# Patient Record
Sex: MALE | Race: WHITE | NOT HISPANIC OR LATINO | Employment: OTHER | URBAN - METROPOLITAN AREA
[De-identification: names, ages, dates, MRNs, and addresses within clinical notes are randomized per-mention and may not be internally consistent; named-entity substitution may affect disease eponyms.]

---

## 2020-03-06 ENCOUNTER — TRANSCRIBE ORDERS (OUTPATIENT)
Dept: PHYSICAL THERAPY | Facility: MEDICAL CENTER | Age: 70
End: 2020-03-06

## 2020-03-06 ENCOUNTER — EVALUATION (OUTPATIENT)
Dept: PHYSICAL THERAPY | Facility: MEDICAL CENTER | Age: 70
End: 2020-03-06
Payer: MEDICARE

## 2020-03-06 DIAGNOSIS — S32.401D CLOSED NONDISPLACED FRACTURE OF RIGHT ACETABULUM WITH ROUTINE HEALING, UNSPECIFIED PORTION OF ACETABULUM, SUBSEQUENT ENCOUNTER: Primary | ICD-10-CM

## 2020-03-06 PROCEDURE — 97110 THERAPEUTIC EXERCISES: CPT | Performed by: PHYSICAL THERAPIST

## 2020-03-06 PROCEDURE — 97161 PT EVAL LOW COMPLEX 20 MIN: CPT | Performed by: PHYSICAL THERAPIST

## 2020-03-06 NOTE — PROGRESS NOTES
PT Evaluation     Today's date: 3/6/2020  Patient name: Lulu Coffey  : 1950  MRN: [id number]  Referring provider: Cathi Aparicio MD  Dx:   Encounter Diagnosis     ICD-10-CM    1  Closed nondisplaced fracture of right acetabulum with routine healing, unspecified portion of acetabulum, subsequent encounter S32 401D                   Assessment  Assessment details: Pt is a pleasant 70 yo male presenting to physical therapy with a R acetabular fracture  Pt would benefit from skilled PT to address current impairments and maximize function  Understanding of Dx/Px/POC: good   Prognosis: good    Goals  Impairment Goals  - Pt I with initial HEP in 1-2 visits  - Improve ROM equal to contralateral side in 4-6 weeks  - Increase strength to 5/5 in all affected areas in 4-6 week    Functional Goals  - Increase FOTO to at least 51 in 6-8 weeks  - Patient will be independent with comprehensive HEP in 6-8 weeks  - Maximize function and endurance in 6-8 weeks    Plan  Patient would benefit from: skilled physical therapy  Other planned modality interventions: Modalities prn  Planned therapy interventions: manual therapy, neuromuscular re-education, patient education, balance, strengthening, stretching, therapeutic activities, therapeutic exercise, gait training and home exercise program  Frequency: 2x week  Duration in weeks: 8  Treatment plan discussed with: family and patient        Subjective Evaluation    History of Present Illness  Mechanism of injury: Pt had a seizure while sleeping and ended up with a acetabulum fracture  This was 2019  He spent 2 weeks in the hospital  The fracture left him with a significant LLD  He has had two episodes of home care PT and one episode of outpatient PT  Pt recently had a 2 week hospital stay secondary to getting overdosed on a medication  He is very deconditioned and sleeps/sits for a lot of the day  Pt notes that he does not sleep well at night    His R hip is very weak and he cannot lift it very well  He requires the assistance of a leg raiser to lift his leg  Pt is planning on having an external lift put on his R shoe  Pts extensive PMH was reviewed via Care Everywhere  Pain  Current pain rating: 3  At best pain ratin  At worst pain ratin    Treatments  Previous treatment: physical therapy  Patient Goals  Patient goals for therapy: increased strength and increased motion  Patient goal: increase endurance, increased mobility, return to driving        Objective     Observations     Additional Observation Details  Gait: Pt ambulates I and safely with a rolling walker  He has a short stride length B  Pt is wearing a removable external heel lift on his R shoe  Endurance: significantly decreased    Balance: significantly decreased B     Neurological Testing     Sensation     Hip   Left Hip   Intact: light touch    Right Hip   Intact: light touch    Active Range of Motion     Additional Active Range of Motion Details  Pt struggles with all AROM R LE  Passive Range of Motion     Additional Passive Range of Motion Details  PROM is WFL in all planes except R hip ext is -10, IR is -10 and flexion is 80 degrees    Joint Play   Left Hip   Joints within functional limits are the posterior hip capsule, anterior hip capsule and lateral hip capsule       Right Hip     Hypomobile in the posterior hip capsule, anterior hip capsule and lateral hip capsule    Strength/Myotome Testing     Left Hip   Planes of Motion   Flexion: 3  Extension: 3  Abduction: 2+  Adduction: 3    Isolated Muscles   Gluteus checo: 3    Right Hip   Planes of Motion   Flexion: 2+  Extension: 2+  Abduction: 2  Adduction: 2+    Isolated Muscles   Gluteus maximums: 2+      Flowsheet Rows      Most Recent Value   PT/OT G-Codes   Current Score  37   Projected Score  51             Precautions: See Care Everywhere for PMH, extensive PMH      Manual  3/6            PROM PRN Exercise Diary  3/6            LTR x20            Supine hip IR/ER with knee ext x20            Add sq 5"  x20            T-band abd Pink  x20            Glute sets 5"  x20            Heel slides NV            Bolster bridge NV                                                                                                                                                                                         Modalities  3/6             PRN

## 2020-03-06 NOTE — LETTER
2020    Argelia Burnett MD  [address]  [facility] [address] [address]    Patient: Mauro Merlin  YOB: 1950   Date of Visit: 3/6/2020     Encounter Diagnosis     ICD-10-CM    1  Closed nondisplaced fracture of right acetabulum with routine healing, unspecified portion of acetabulum, subsequent encounter S32 401D        Dear Dr Johnson Common:    Thank you for your recent referral of Mauro Merlin    Please review the attached evaluation summary from Marshall's recent visit  Please verify that you agree with the plan of care by signing the attached order  If you have any questions or concerns, please do not hesitate to call  I sincerely appreciate the opportunity to share in the care of one of your patients and hope to have another opportunity to work with you in the near future  Sincerely,    Cleveland Lilly, PT      Referring Provider:      I certify that I have read the below Plan of Care and certify the need for these services furnished under this plan of treatment while under my care  Argelia Burnett MD  [address]  [facility] [address] [address]  VIA Facsimile: [phone]          PT Evaluation     Today's date: 3/6/2020  Patient name: Mauro Merlin  : 1950  MRN: [id number]  Referring provider: Juan Augustine MD  Dx:   Encounter Diagnosis     ICD-10-CM    1  Closed nondisplaced fracture of right acetabulum with routine healing, unspecified portion of acetabulum, subsequent encounter S32 401D                   Assessment  Assessment details: Pt is a pleasant 70 yo male presenting to physical therapy with a R acetabular fracture  Pt would benefit from skilled PT to address current impairments and maximize function    Understanding of Dx/Px/POC: good   Prognosis: good    Goals  Impairment Goals  - Pt I with initial HEP in 1-2 visits  - Improve ROM equal to contralateral side in 4-6 weeks  - Increase strength to 5/5 in all affected areas in 4-6 week    Functional Goals  - Increase FOTO to at least 51 in 6-8 weeks  - Patient will be independent with comprehensive HEP in 6-8 weeks  - Maximize function and endurance in 6-8 weeks    Plan  Patient would benefit from: skilled physical therapy  Other planned modality interventions: Modalities prn  Planned therapy interventions: manual therapy, neuromuscular re-education, patient education, balance, strengthening, stretching, therapeutic activities, therapeutic exercise, gait training and home exercise program  Frequency: 2x week  Duration in weeks: 8  Treatment plan discussed with: family and patient        Subjective Evaluation    History of Present Illness  Mechanism of injury: Pt had a seizure while sleeping and ended up with a acetabulum fracture  This was 2019  He spent 2 weeks in the hospital  The fracture left him with a significant LLD  He has had two episodes of home care PT and one episode of outpatient PT  Pt recently had a 2 week hospital stay secondary to getting overdosed on a medication  He is very deconditioned and sleeps/sits for a lot of the day  Pt notes that he does not sleep well at night  His R hip is very weak and he cannot lift it very well  He requires the assistance of a leg raiser to lift his leg  Pt is planning on having an external lift put on his R shoe  Pts extensive PMH was reviewed via Care Everywhere  Pain  Current pain rating: 3  At best pain ratin  At worst pain ratin    Treatments  Previous treatment: physical therapy  Patient Goals  Patient goals for therapy: increased strength and increased motion  Patient goal: increase endurance, increased mobility, return to driving        Objective     Observations     Additional Observation Details  Gait: Pt ambulates I and safely with a rolling walker  He has a short stride length B  Pt is wearing a removable external heel lift on his R shoe      Endurance: significantly decreased    Balance: significantly decreased B     Neurological Testing     Sensation     Hip   Left Hip   Intact: light touch    Right Hip   Intact: light touch    Active Range of Motion     Additional Active Range of Motion Details  Pt struggles with all AROM R LE  Passive Range of Motion     Additional Passive Range of Motion Details  PROM is WFL in all planes except R hip ext is -10, IR is -10 and flexion is 80 degrees    Joint Play   Left Hip   Joints within functional limits are the posterior hip capsule, anterior hip capsule and lateral hip capsule       Right Hip     Hypomobile in the posterior hip capsule, anterior hip capsule and lateral hip capsule    Strength/Myotome Testing     Left Hip   Planes of Motion   Flexion: 3  Extension: 3  Abduction: 2+  Adduction: 3    Isolated Muscles   Gluteus checo: 3    Right Hip   Planes of Motion   Flexion: 2+  Extension: 2+  Abduction: 2  Adduction: 2+    Isolated Muscles   Gluteus maximums: 2+      Flowsheet Rows      Most Recent Value   PT/OT G-Codes   Current Score  37   Projected Score  51             Precautions: See Care Everywhere for PMH, extensive PMH      Manual  3/6            PROM PRN                                                                    Exercise Diary  3/6            LTR x20            Supine hip IR/ER with knee ext x20            Add sq 5"  x20            T-band abd Pink  x20            Glute sets 5"  x20            Heel slides NV            Bolster bridge NV                                                                                                                                                                                         Modalities  3/6            MH PRN

## 2020-03-09 ENCOUNTER — OFFICE VISIT (OUTPATIENT)
Dept: PHYSICAL THERAPY | Facility: MEDICAL CENTER | Age: 70
End: 2020-03-09
Payer: MEDICARE

## 2020-03-09 DIAGNOSIS — S32.401D CLOSED NONDISPLACED FRACTURE OF RIGHT ACETABULUM WITH ROUTINE HEALING, UNSPECIFIED PORTION OF ACETABULUM, SUBSEQUENT ENCOUNTER: Primary | ICD-10-CM

## 2020-03-09 PROCEDURE — 97110 THERAPEUTIC EXERCISES: CPT | Performed by: PHYSICAL THERAPIST

## 2020-03-09 PROCEDURE — 97112 NEUROMUSCULAR REEDUCATION: CPT | Performed by: PHYSICAL THERAPIST

## 2020-03-09 NOTE — PROGRESS NOTES
Daily Note     Today's date: 3/9/2020  Patient name: Kala Plaza  : 1950  MRN: [id number]  Referring provider: Rafita Mendez MD  Dx:   Encounter Diagnosis     ICD-10-CM    1  Closed nondisplaced fracture of right acetabulum with routine healing, unspecified portion of acetabulum, subsequent encounter S32 401D        Subjective: Pt reports that he has been doing his HEP regularly and feels like his ROM is improved since starting the exercises  Objective: See treatment diary below      Assessment: Tolerated treatment well  Patient demonstrated fatigue post treatment, exhibited good technique with therapeutic exercises and would benefit from continued PT  Plan: Continue per plan of care        Precautions: See Care Everywhere for PMH, extensive PMH      Manual  3/6 3/9           PROM PRN NP                                                                   Exercise Diary  3/6 3/9           LTR x20 x20           Supine hip IR/ER with knee ext x20 x20           Add sq 5"  x20 5"  x20           T-band abd Pink  x20 Purp  2x10           Glute sets 5"  x20 HEP           Heel slides NV 2x10           Bolster bridge NV 2x10           Supine Ad/ab  2x10           Bike  5'                                                                                                                                                              Modalities  3/6 3/9           MH PRN NP

## 2020-03-12 ENCOUNTER — OFFICE VISIT (OUTPATIENT)
Dept: PHYSICAL THERAPY | Facility: MEDICAL CENTER | Age: 70
End: 2020-03-12
Payer: MEDICARE

## 2020-03-12 DIAGNOSIS — S32.401D CLOSED NONDISPLACED FRACTURE OF RIGHT ACETABULUM WITH ROUTINE HEALING, UNSPECIFIED PORTION OF ACETABULUM, SUBSEQUENT ENCOUNTER: Primary | ICD-10-CM

## 2020-03-12 PROCEDURE — 97112 NEUROMUSCULAR REEDUCATION: CPT | Performed by: PHYSICAL THERAPIST

## 2020-03-12 PROCEDURE — 97140 MANUAL THERAPY 1/> REGIONS: CPT | Performed by: PHYSICAL THERAPIST

## 2020-03-12 PROCEDURE — 97110 THERAPEUTIC EXERCISES: CPT | Performed by: PHYSICAL THERAPIST

## 2020-03-12 NOTE — PROGRESS NOTES
Daily Note     Today's date: 3/12/2020  Patient name: Danisha Rivers  : 1950  MRN: [id number]  Referring provider: Bertin Brenner MD  Dx:   Encounter Diagnosis     ICD-10-CM    1  Closed nondisplaced fracture of right acetabulum with routine healing, unspecified portion of acetabulum, subsequent encounter S32 401D             Subjective: Pt reports that he has been doing his HEP at least once per day  Pt feels like his hip motion is improved since starting PT  Objective: See treatment diary below      Assessment: Tolerated treatment fair  Patient demonstrated fatigue post treatment, exhibited good technique with therapeutic exercises and would benefit from continued PT  Pt with notable improvement in AROM R LE in the past week  Plan: Continue per plan of care        Precautions: See Care Everywhere for PMH, extensive PMH      Manual  3/6 3/9 3/12          PROM PRN NP HK                                                                  Exercise Diary  3/6 3/9 3/12          LTR x20 x20 x25          Supine hip IR/ER with knee ext x20 x20 x25          Add sq 5"  x20 5"  x20 5"  x25          T-band abd Pink  x20 Purp  2x10 purp  x25          Glute sets 5"  x20 HEP           Heel slides NV 2x10 x25          Bolster bridge NV 2x10 x25          Supine Ad/ab  2x10 x25          Bike  5' 5'          Clam shells   2x10

## 2020-03-17 ENCOUNTER — APPOINTMENT (OUTPATIENT)
Dept: PHYSICAL THERAPY | Facility: MEDICAL CENTER | Age: 70
End: 2020-03-17
Payer: MEDICARE

## 2020-03-19 ENCOUNTER — APPOINTMENT (OUTPATIENT)
Dept: PHYSICAL THERAPY | Facility: MEDICAL CENTER | Age: 70
End: 2020-03-19
Payer: MEDICARE

## 2020-03-23 ENCOUNTER — APPOINTMENT (OUTPATIENT)
Dept: PHYSICAL THERAPY | Facility: MEDICAL CENTER | Age: 70
End: 2020-03-23
Payer: MEDICARE

## 2020-03-26 ENCOUNTER — APPOINTMENT (OUTPATIENT)
Dept: PHYSICAL THERAPY | Facility: MEDICAL CENTER | Age: 70
End: 2020-03-26
Payer: MEDICARE

## 2020-03-30 ENCOUNTER — APPOINTMENT (OUTPATIENT)
Dept: PHYSICAL THERAPY | Facility: MEDICAL CENTER | Age: 70
End: 2020-03-30
Payer: MEDICARE

## 2020-04-02 ENCOUNTER — APPOINTMENT (OUTPATIENT)
Dept: PHYSICAL THERAPY | Facility: MEDICAL CENTER | Age: 70
End: 2020-04-02
Payer: MEDICARE

## 2020-04-16 ENCOUNTER — TELEMEDICINE (OUTPATIENT)
Dept: PHYSICAL THERAPY | Facility: MEDICAL CENTER | Age: 70
End: 2020-04-16
Payer: MEDICARE

## 2020-04-16 DIAGNOSIS — S32.401D CLOSED DISPLACED FRACTURE OF RIGHT ACETABULUM WITH ROUTINE HEALING, UNSPECIFIED PORTION OF ACETABULUM, SUBSEQUENT ENCOUNTER: Primary | ICD-10-CM

## 2020-04-16 PROCEDURE — 97116 GAIT TRAINING THERAPY: CPT | Performed by: PHYSICAL THERAPIST

## 2020-04-16 PROCEDURE — 97110 THERAPEUTIC EXERCISES: CPT | Performed by: PHYSICAL THERAPIST

## 2021-02-11 ENCOUNTER — EVALUATION (OUTPATIENT)
Dept: PHYSICAL THERAPY | Facility: MEDICAL CENTER | Age: 71
End: 2021-02-11
Payer: MEDICARE

## 2021-02-11 DIAGNOSIS — Z96.641 STATUS POST TOTAL REPLACEMENT OF RIGHT HIP: ICD-10-CM

## 2021-02-11 DIAGNOSIS — S32.401D CLOSED DISPLACED FRACTURE OF RIGHT ACETABULUM WITH ROUTINE HEALING, UNSPECIFIED PORTION OF ACETABULUM, SUBSEQUENT ENCOUNTER: Primary | ICD-10-CM

## 2021-02-11 PROCEDURE — 97140 MANUAL THERAPY 1/> REGIONS: CPT | Performed by: PHYSICAL THERAPIST

## 2021-02-11 PROCEDURE — 97110 THERAPEUTIC EXERCISES: CPT | Performed by: PHYSICAL THERAPIST

## 2021-02-11 PROCEDURE — 97161 PT EVAL LOW COMPLEX 20 MIN: CPT | Performed by: PHYSICAL THERAPIST

## 2021-02-11 NOTE — PROGRESS NOTES
PT Evaluation     Today's date: 2021  Patient name: Donna Barahona  : 1950  MRN: [id number]  Referring provider: Sabina Shaver DO  Dx:   Encounter Diagnosis     ICD-10-CM    1  Closed displaced fracture of right acetabulum with routine healing, unspecified portion of acetabulum, subsequent encounter  S32 401D    2  Status post total replacement of right hip  Z96 [id number]        Start Time: 1020  Stop Time: 1100  Total time in clinic (min): 40 minutes    Assessment  Assessment details: Pt is a pleasant 78 yo male presenting to physical therapy s/p R VENKATESH  Pt would benefit from skilled PT to address current impairments and maximize pts function  Understanding of Dx/Px/POC: good   Prognosis: good    Goals  Impairment Goals  - Pt I with initial HEP in 1-2 visits  - Improve ROM equal to contralateral side in 6 weeks  - Increase strength to at least 3+/5 in all affected areas in 4-6 week    Functional Goals  - Increase FOTO to at least 44 in 6-8 weeks  - Patient will be independent with comprehensive HEP in 8+ weeks  - Maximize ambulation and activity tolerance in 8+ weeks      Plan  Patient would benefit from: skilled physical therapy  Other planned modality interventions: Modalities prn   Planned therapy interventions: manual therapy, neuromuscular re-education, patient education, postural training, strengthening, stretching, therapeutic activities, therapeutic exercise and home exercise program  Frequency: 2x week  Duration in weeks: 8  Treatment plan discussed with: patient and family        Subjective Evaluation    History of Present Illness  Date of surgery: 2021  Mechanism of injury: Pt with a long history of hip pain and dysfunction from a hip injury that occur when he had a seizure  Pt underwent the surgery on a Monday, was transitioned to transitional care after the surgery and went home on Friday  He states that his hip is feeling better and having less hip pain since surgery  Pain  Current pain ratin  At best pain ratin  At worst pain ratin    Social Support    Employment status: not working  Treatments  Previous treatment: physical therapy  Patient Goals  Patient goals for therapy: decreased pain, increased motion, increased strength, independence with ADLs/IADLs and improved balance  Patient goal: Improved activity tolerance        Objective     Observations     Additional Observation Details  Gait:  Patient ambulates with a slow, steady gait pattern using a RW, WBAT R LE    + moderate swelling R LE         Neurological Testing     Sensation     Hip   Left Hip   Intact: light touch    Right Hip   Intact: light touch    Active Range of Motion   Left Hip   Normal active range of motion    Right Hip   Flexion: 70 degrees   Abduction: 30 degrees   Adduction: 10 degrees     Passive Range of Motion   Left Hip   Flexion: 100 degrees   Extension: -10 degrees   Abduction: WFL  Adduction: WFL    Right Hip   Flexion: 90 degrees   Extension: -10 degrees   Abduction: WFL  Adduction: WFL    Additional Passive Range of Motion Details  IR and ER are WFL B in supine    Strength/Myotome Testing     Left Hip   Planes of Motion   Flexion: 3  Extension: 3  Abduction: 3  Adduction: 3    Right Hip   Planes of Motion   Flexion: 2  Extension: 2  Abduction: 2  Adduction: 2      Flowsheet Rows      Most Recent Value   PT/OT G-Codes   Current Score  14   Projected Score  44             Precautions: s/p R THR, chronic respiratory contdition      Manuals             PROM HK                                                   Ther Ex             Glute sets 5"  x30            Quad sets 5"  x30            Add sq 5"  x30            AA heel slides x30            AA abd/add x30                                                                                                                                                           Ther Activity                                       Gait Training  Brief HK                         Modalities 2/11             PRN

## 2021-02-11 NOTE — LETTER
February 15, 2021    Dipti Whatley [address] [address] [address]  [address]  [address] [facility]    Patient: Ozzie Koch  YOB: 1950   Date of Visit: 2021     Encounter Diagnosis     ICD-10-CM    1  Closed displaced fracture of right acetabulum with routine healing, unspecified portion of acetabulum, subsequent encounter  S32 401D    2  Status post total replacement of right hip  Z96 641        Dear Dr Belinda Tilley: Thank you for your recent referral of Ozzie Koch    Please review the attached evaluation summary from Marshall's recent visit  Please verify that you agree with the plan of care by signing the attached order  If you have any questions or concerns, please do not hesitate to call  I sincerely appreciate the opportunity to share in the care of one of your patients and hope to have another opportunity to work with you in the near future  Sincerely,    Ian Zheng, PT      Referring Provider:      I certify that I have read the below Plan of Care and certify the need for these services furnished under this plan of treatment while under my care  Dipti Whatley DO  [address] [address] [address]  [address]  [address] [address]  Via Fax: [phone]          PT Evaluation     Today's date: 2021  Patient name: Ozzie Koch  : 1950  MRN: [id number]  Referring provider: Ayleen Guevara DO  Dx:   Encounter Diagnosis     ICD-10-CM    1  Closed displaced fracture of right acetabulum with routine healing, unspecified portion of acetabulum, subsequent encounter  S32 401D    2  Status post total replacement of right hip  Z96 641        Start Time: 1020  Stop Time: 1100  Total time in clinic (min): 40 minutes    Assessment  Assessment details: Pt is a pleasant 80 yo male presenting to physical therapy s/p R VENKATESH  Pt would benefit from skilled PT to address current impairments and maximize pts function      Understanding of Dx/Px/POC: good Prognosis: good    Goals  Impairment Goals  - Pt I with initial HEP in 1-2 visits  - Improve ROM equal to contralateral side in 6 weeks  - Increase strength to at least 3+/5 in all affected areas in 4-6 week    Functional Goals  - Increase FOTO to at least 44 in 6-8 weeks  - Patient will be independent with comprehensive HEP in 8+ weeks  - Maximize ambulation and activity tolerance in 8+ weeks      Plan  Patient would benefit from: skilled physical therapy  Other planned modality interventions: Modalities prn   Planned therapy interventions: manual therapy, neuromuscular re-education, patient education, postural training, strengthening, stretching, therapeutic activities, therapeutic exercise and home exercise program  Frequency: 2x week  Duration in weeks: 8  Treatment plan discussed with: patient and family        Subjective Evaluation    History of Present Illness  Date of surgery: 2021  Mechanism of injury: Pt with a long history of hip pain and dysfunction from a hip injury that occur when he had a seizure  Pt underwent the surgery on a Monday, was transitioned to transitional care after the surgery and went home on Friday  He states that his hip is feeling better and having less hip pain since surgery        Pain  Current pain ratin  At best pain ratin  At worst pain ratin    Social Support    Employment status: not working  Treatments  Previous treatment: physical therapy  Patient Goals  Patient goals for therapy: decreased pain, increased motion, increased strength, independence with ADLs/IADLs and improved balance  Patient goal: Improved activity tolerance        Objective     Observations     Additional Observation Details  Gait:  Patient ambulates with a slow, steady gait pattern using a RW, WBAT R LE    + moderate swelling R LE         Neurological Testing     Sensation     Hip   Left Hip   Intact: light touch    Right Hip   Intact: light touch    Active Range of Motion   Left Hip Normal active range of motion    Right Hip   Flexion: 70 degrees   Abduction: 30 degrees   Adduction: 10 degrees     Passive Range of Motion   Left Hip   Flexion: 100 degrees   Extension: -10 degrees   Abduction: WFL  Adduction: WFL    Right Hip   Flexion: 90 degrees   Extension: -10 degrees   Abduction: WFL  Adduction: WFL    Additional Passive Range of Motion Details  IR and ER are WFL B in supine    Strength/Myotome Testing     Left Hip   Planes of Motion   Flexion: 3  Extension: 3  Abduction: 3  Adduction: 3    Right Hip   Planes of Motion   Flexion: 2  Extension: 2  Abduction: 2  Adduction: 2      Flowsheet Rows      Most Recent Value   PT/OT G-Codes   Current Score  14   Projected Score  44             Precautions: s/p R THR, chronic respiratory contdition      Manuals 2/11            PROM HK                                                   Ther Ex 2/11            Glute sets 5"  x30            Quad sets 5"  x30            Add sq 5"  x30            AA heel slides x30            AA abd/add x30                                                                                                                                                           Ther Activity                                       Gait Training 2/11            Brief HK                         Modalities 2/11             PRN

## 2021-02-12 ENCOUNTER — TRANSCRIBE ORDERS (OUTPATIENT)
Dept: PHYSICAL THERAPY | Facility: MEDICAL CENTER | Age: 71
End: 2021-02-12

## 2021-02-12 DIAGNOSIS — S32.401D CLOSED DISPLACED FRACTURE OF RIGHT ACETABULUM WITH ROUTINE HEALING, UNSPECIFIED PORTION OF ACETABULUM, SUBSEQUENT ENCOUNTER: Primary | ICD-10-CM

## 2021-02-16 ENCOUNTER — OFFICE VISIT (OUTPATIENT)
Dept: PHYSICAL THERAPY | Facility: MEDICAL CENTER | Age: 71
End: 2021-02-16
Payer: MEDICARE

## 2021-02-16 DIAGNOSIS — S32.401D CLOSED DISPLACED FRACTURE OF RIGHT ACETABULUM WITH ROUTINE HEALING, UNSPECIFIED PORTION OF ACETABULUM, SUBSEQUENT ENCOUNTER: Primary | ICD-10-CM

## 2021-02-16 DIAGNOSIS — Z96.641 STATUS POST TOTAL REPLACEMENT OF RIGHT HIP: ICD-10-CM

## 2021-02-16 PROCEDURE — 97110 THERAPEUTIC EXERCISES: CPT

## 2021-02-16 PROCEDURE — 97140 MANUAL THERAPY 1/> REGIONS: CPT

## 2021-02-16 PROCEDURE — 97112 NEUROMUSCULAR REEDUCATION: CPT

## 2021-02-16 NOTE — PROGRESS NOTES
Daily Note     Today's date: 2021  Patient name: Zuleyma Arrington  : 1950  MRN: [id number]  Referring provider: Lety Lr MD  Dx:   Encounter Diagnosis     ICD-10-CM    1  Closed displaced fracture of right acetabulum with routine healing, unspecified portion of acetabulum, subsequent encounter  S32 401D    2  Status post total replacement of right hip  Z96 641                   Subjective: Pt reports that he has been managing the one step getting in and out of his house pretty well  Pt notes quad tightness over the past several days  Objective: See treatment diary below      Assessment: Tolerated treatment well  Patient demonstrated fatigue post treatment, exhibited good technique with therapeutic exercises and would benefit from continued PT        Plan: Continue per plan of care        Precautions: s/p R THR, chronic respiratory contdition      Manuals            PROM HK KO                                                  Ther Ex            Glute sets 5"  x30 5"  x30           Quad sets 5"  x30 5"  x30           Add sq 5"  x30 5"x30           AA heel slides x30 x30           AA abd/add x30 x30                                                                                                                                                          Ther Activity                                       Gait Training             Brief HK                         Modalities             PRN            MHP post  15'

## 2021-02-18 ENCOUNTER — APPOINTMENT (OUTPATIENT)
Dept: PHYSICAL THERAPY | Facility: MEDICAL CENTER | Age: 71
End: 2021-02-18
Payer: MEDICARE

## 2021-02-23 ENCOUNTER — OFFICE VISIT (OUTPATIENT)
Dept: PHYSICAL THERAPY | Facility: MEDICAL CENTER | Age: 71
End: 2021-02-23
Payer: MEDICARE

## 2021-02-23 DIAGNOSIS — S32.401D CLOSED DISPLACED FRACTURE OF RIGHT ACETABULUM WITH ROUTINE HEALING, UNSPECIFIED PORTION OF ACETABULUM, SUBSEQUENT ENCOUNTER: Primary | ICD-10-CM

## 2021-02-23 DIAGNOSIS — Z96.641 STATUS POST TOTAL REPLACEMENT OF RIGHT HIP: ICD-10-CM

## 2021-02-23 PROCEDURE — 97112 NEUROMUSCULAR REEDUCATION: CPT

## 2021-02-23 PROCEDURE — 97110 THERAPEUTIC EXERCISES: CPT

## 2021-02-23 PROCEDURE — 97140 MANUAL THERAPY 1/> REGIONS: CPT

## 2021-02-23 NOTE — PROGRESS NOTES
Daily Note     Today's date: 2021  Patient name: Jaylene Santiago  : 1950  MRN: [id number]  Referring provider: Jose Antonio Mckeon MD  Dx:   Encounter Diagnosis     ICD-10-CM    1  Closed displaced fracture of right acetabulum with routine healing, unspecified portion of acetabulum, subsequent encounter  S32 401D    2  Status post total replacement of right hip  Z96 641                   Subjective: Pt states that his quad mm and knee are sore and stiff at times  Objective: See treatment diary below      Assessment: Tolerated treatment well  Patient demonstrated fatigue post treatment, exhibited good technique with therapeutic exercises and would benefit from continued PT  Pt is responding well to current tx plan  Plan: Continue per plan of care        Precautions: s/p R THR, chronic respiratory contdition      Manuals           PROM HK KO KO                                                 Ther Ex           Glute sets 5"  x30 5"  x30 5"  x30          Quad sets 5"  x30 5"  x30 5"  x30          Add sq 5"  x30 5"x30 5"  3x10          AA heel slides x30 x30 3x10          AA abd/add x30 x30 np          Abd   Orng  3x10          SAQ   3x10          Bridges w/foam roller   3x10                                                                                                                  Ther Activity                                       Gait Training             Brief HK                         Modalities             PRN            MHP post  15' np

## 2021-02-25 ENCOUNTER — OFFICE VISIT (OUTPATIENT)
Dept: PHYSICAL THERAPY | Facility: MEDICAL CENTER | Age: 71
End: 2021-02-25
Payer: MEDICARE

## 2021-02-25 DIAGNOSIS — Z96.641 STATUS POST TOTAL REPLACEMENT OF RIGHT HIP: ICD-10-CM

## 2021-02-25 DIAGNOSIS — S32.401D CLOSED DISPLACED FRACTURE OF RIGHT ACETABULUM WITH ROUTINE HEALING, UNSPECIFIED PORTION OF ACETABULUM, SUBSEQUENT ENCOUNTER: Primary | ICD-10-CM

## 2021-02-25 PROCEDURE — 97140 MANUAL THERAPY 1/> REGIONS: CPT

## 2021-02-25 PROCEDURE — 97112 NEUROMUSCULAR REEDUCATION: CPT

## 2021-02-25 PROCEDURE — 97110 THERAPEUTIC EXERCISES: CPT

## 2021-02-25 NOTE — PROGRESS NOTES
Daily Note     Today's date: 2021  Patient name: Jaylene Santiago  : 1950  MRN: [id number]  Referring provider: Jose Antonio Mckeon MD  Dx:   Encounter Diagnosis     ICD-10-CM    1  Closed displaced fracture of right acetabulum with routine healing, unspecified portion of acetabulum, subsequent encounter  S32 401D    2  Status post total replacement of right hip  Z96 641                   Subjective: Pt states that he felt good after LV  PT performed his ex's earlier today and reports mild fatigue  Objective: See treatment diary below      Assessment: Tolerated treatment well  Patient demonstrated fatigue post treatment, exhibited good technique with therapeutic exercises and would benefit from continued PT      Plan: Continue per plan of care        Precautions: s/p R THR, chronic respiratory contdition      Manuals          PROM HK KO KO KO                                                Ther Ex          Glute sets 5"  x30 5"  x30 5"  x30 5"  x30         Quad sets 5"  x30 5"  x30 5"  x30 5"  x30         Add sq 5"  x30 5"x30 5"  3x10 5"  3x10         AA heel slides x30 x30 3x10 3x10         AA abd/add x30 x30 np np         Abd   Orng  3x10 Red  3x10         SAQ   3x10 3x10         Bridges w/foam roller   3x10 np         LAQ    3x10         Standing HR's    3x10                                                                                       Ther Activity                                       Gait Training             Brief HK                         Modalities             PRN            MHP post  15' np np

## 2021-03-02 ENCOUNTER — OFFICE VISIT (OUTPATIENT)
Dept: PHYSICAL THERAPY | Facility: MEDICAL CENTER | Age: 71
End: 2021-03-02
Payer: MEDICARE

## 2021-03-02 DIAGNOSIS — Z96.641 STATUS POST TOTAL REPLACEMENT OF RIGHT HIP: ICD-10-CM

## 2021-03-02 DIAGNOSIS — S32.401D CLOSED DISPLACED FRACTURE OF RIGHT ACETABULUM WITH ROUTINE HEALING, UNSPECIFIED PORTION OF ACETABULUM, SUBSEQUENT ENCOUNTER: Primary | ICD-10-CM

## 2021-03-02 PROCEDURE — 97140 MANUAL THERAPY 1/> REGIONS: CPT

## 2021-03-02 PROCEDURE — 97110 THERAPEUTIC EXERCISES: CPT

## 2021-03-02 PROCEDURE — 97112 NEUROMUSCULAR REEDUCATION: CPT

## 2021-03-04 ENCOUNTER — OFFICE VISIT (OUTPATIENT)
Dept: PHYSICAL THERAPY | Facility: MEDICAL CENTER | Age: 71
End: 2021-03-04
Payer: MEDICARE

## 2021-03-04 DIAGNOSIS — Z96.641 STATUS POST TOTAL REPLACEMENT OF RIGHT HIP: ICD-10-CM

## 2021-03-04 DIAGNOSIS — S32.401D CLOSED DISPLACED FRACTURE OF RIGHT ACETABULUM WITH ROUTINE HEALING, UNSPECIFIED PORTION OF ACETABULUM, SUBSEQUENT ENCOUNTER: Primary | ICD-10-CM

## 2021-03-04 PROCEDURE — 97112 NEUROMUSCULAR REEDUCATION: CPT | Performed by: PHYSICAL THERAPIST

## 2021-03-04 PROCEDURE — 97110 THERAPEUTIC EXERCISES: CPT | Performed by: PHYSICAL THERAPIST

## 2021-03-04 NOTE — PROGRESS NOTES
Daily Note     Today's date: 3/4/2021  Patient name: Gila Wayne  : 1950  MRN: [id number]  Referring provider: Anabell Miguel MD  Dx:   Encounter Diagnosis     ICD-10-CM    1  Closed displaced fracture of right acetabulum with routine healing, unspecified portion of acetabulum, subsequent encounter  S32 401D    2  Status post total replacement of right hip  Z96 641             Subjective: Pt reports that he is feeling better  His breathing and activity tolerance are improved  Objective: See treatment diary below      Assessment: Tolerated treatment well  Patient demonstrated fatigue post treatment, exhibited good technique with therapeutic exercises and would benefit from continued PT  Pt is doing very well and progressing nicely  Plan: Continue per plan of care        Precautions: s/p R THR, chronic respiratory contdition      Manuals 2/11 2/16 2/23 2/25 3/2 3       PROM HK KO KO KO KO NR D/C                                             Ther Ex 2/11 2/16 2/23 2/25 3/2 3/       Glute sets 5"  x30 5"  x30 5"  x30 5"  x30 5"  x30 5"  x30       Quad sets 5"  x30 5"  x30 5"  x30 5"  x30 5"  x30 5"  x30       Add sq 5"  x30 5"x30 5"  3x10 5"  3x10 5"  4x10 5"  x40       AA heel slides x30 x30 3x10 3x10 3x10 3x10       AA abd/add x30 x30 np np np 3x10       Abd   Orng  3x10 Red  3x10 Red  4x10 Red  3x10       SAQ   3x10 3x10 3x12 3x15       Bridges w/foam roller   3x10 np 3x10 3x10       LAQ    3x10 3x10 3x15       Standing HR's    3x10 np 3x10       Seated marches     2x10 3x12                                                                        Ther Activity                                       Gait Training             Brief HK                         Modalities             PRN            MHP post  15' np np

## 2021-03-09 ENCOUNTER — OFFICE VISIT (OUTPATIENT)
Dept: PHYSICAL THERAPY | Facility: MEDICAL CENTER | Age: 71
End: 2021-03-09
Payer: MEDICARE

## 2021-03-09 DIAGNOSIS — Z96.641 STATUS POST TOTAL REPLACEMENT OF RIGHT HIP: ICD-10-CM

## 2021-03-09 DIAGNOSIS — S32.401D CLOSED DISPLACED FRACTURE OF RIGHT ACETABULUM WITH ROUTINE HEALING, UNSPECIFIED PORTION OF ACETABULUM, SUBSEQUENT ENCOUNTER: Primary | ICD-10-CM

## 2021-03-09 PROCEDURE — 97112 NEUROMUSCULAR REEDUCATION: CPT

## 2021-03-09 PROCEDURE — 97110 THERAPEUTIC EXERCISES: CPT

## 2021-03-09 NOTE — PROGRESS NOTES
Daily Note     Today's date: 3/9/2021  Patient name: Mauro Merlin  : 1950  MRN: [id number]  Referring provider: Juan Augustine MD  Dx:   Encounter Diagnosis     ICD-10-CM    1  Closed displaced fracture of right acetabulum with routine healing, unspecified portion of acetabulum, subsequent encounter  S32 401D    2  Status post total replacement of right hip  Z96 641                   Subjective: Pt reports that he's been performing his hep daily  Pt states that his hip feels sore  Objective: See treatment diary below      Assessment: Tolerated treatment well  Patient demonstrated fatigue post treatment, exhibited good technique with therapeutic exercises and would benefit from continued PT  Pt's quad strength is slowly improving  Plan: Continue per plan of care        Precautions: s/p R THR, chronic respiratory contdition      Manuals 2/11 2/16 2/23 2/25 3/2 3/4 3/9      PROM HK KO KO KO KO NR D/C                                             Ther Ex 2/11 2/16 2/23 2/25 3/2 3/4 3/9      Glute sets 5"  x30 5"  x30 5"  x30 5"  x30 5"  x30 5"  x30 5"  x30      Quad sets 5"  x30 5"  x30 5"  x30 5"  x30 5"  x30 5"  x30 5"  x30      Add sq 5"  x30 5"x30 5"  3x10 5"  3x10 5"  4x10 5"  x40 5"  x40      AA heel slides x30 x30 3x10 3x10 3x10 3x10 30x      AA abd/add x30 x30 np np np 3x10 np      Abd   Orng  3x10 Red  3x10 Red  4x10 Red  3x10 Red  3x10      SAQ   3x10 3x10 3x12 3x15 3x15      Bridges w/foam roller   3x10 np 3x10 3x10 NT      LAQ    3x10 3x10 3x15 3x15      Standing HR's    3x10 np 3x10 3x12      Seated marches     2x10 3x12 On chair with pillow  3x10                                                                       Ther Activity                                       Gait Training             Brief HK                         Modalities             PRN            MHP post  15' np np

## 2021-03-11 ENCOUNTER — OFFICE VISIT (OUTPATIENT)
Dept: PHYSICAL THERAPY | Facility: MEDICAL CENTER | Age: 71
End: 2021-03-11
Payer: MEDICARE

## 2021-03-11 DIAGNOSIS — Z96.641 STATUS POST TOTAL REPLACEMENT OF RIGHT HIP: ICD-10-CM

## 2021-03-11 DIAGNOSIS — S32.401D CLOSED DISPLACED FRACTURE OF RIGHT ACETABULUM WITH ROUTINE HEALING, UNSPECIFIED PORTION OF ACETABULUM, SUBSEQUENT ENCOUNTER: Primary | ICD-10-CM

## 2021-03-11 PROCEDURE — 97110 THERAPEUTIC EXERCISES: CPT

## 2021-03-11 PROCEDURE — 97112 NEUROMUSCULAR REEDUCATION: CPT

## 2021-03-11 NOTE — PROGRESS NOTES
Daily Note     Today's date: 3/11/2021  Patient name: Micaela Del Toro  : 1950  MRN: [id number]  Referring provider: Lisa Larsen MD  Dx:   Encounter Diagnosis     ICD-10-CM    1  Closed displaced fracture of right acetabulum with routine healing, unspecified portion of acetabulum, subsequent encounter  S32 401D    2  Status post total replacement of right hip  Z96 641                   Subjective: Pt reports that his hip is feeling pretty good  Objective: See treatment diary below      Assessment: Tolerated treatment well  Patient demonstrated fatigue post treatment, exhibited good technique with therapeutic exercises and would benefit from continued PT  Pt demonstrated improved endurance today  Plan: Continue per plan of care        Precautions: s/p R THR, chronic respiratory contdition      Manuals 2/11 2/16 2/23 2/25 3/2 3/4 3/9 3/11     PROM HK KO KO KO KO NR D/C D/C                                            Ther Ex 2/11 2/16 2/23 2/25 3/2 3/4 3/9 3/11     Glute sets 5"  x30 5"  x30 5"  x30 5"  x30 5"  x30 5"  x30 5"  x30 5"  x30     Quad sets 5"  x30 5"  x30 5"  x30 5"  x30 5"  x30 5"  x30 5"  x30 5"  x30     Add sq 5"  x30 5"x30 5"  3x10 5"  3x10 5"  4x10 5"  x40 5"  x40 5"  x40     AA heel slides x30 x30 3x10 3x10 3x10 3x10 30x 30x     AA abd/add x30 x30 np np np 3x10 np 30x     Abd   Orng  3x10 Red  3x10 Red  4x10 Red  3x10 Red  3x10 Blue  3x10     SAQ   3x10 3x10 3x12 3x15 3x15 3x15     Bridges w/foam roller   3x10 np 3x10 3x10 NT NT     LAQ    3x10 3x10 3x15 3x15 3x15     Standing HR's    3x10 np 3x10 3x12 3x12     Seated marches     2x10 3x12 On chair with pillow  3x10 3x10  On  Chair  With  pillow     Sidestepping        2'x6                                                         Ther Activity                                       Gait Training             Brief HK                         Modalities             PRN            MHP post  13' np np

## 2021-03-16 ENCOUNTER — OFFICE VISIT (OUTPATIENT)
Dept: PHYSICAL THERAPY | Facility: MEDICAL CENTER | Age: 71
End: 2021-03-16
Payer: MEDICARE

## 2021-03-16 DIAGNOSIS — Z96.641 STATUS POST TOTAL REPLACEMENT OF RIGHT HIP: ICD-10-CM

## 2021-03-16 DIAGNOSIS — S32.401D CLOSED DISPLACED FRACTURE OF RIGHT ACETABULUM WITH ROUTINE HEALING, UNSPECIFIED PORTION OF ACETABULUM, SUBSEQUENT ENCOUNTER: Primary | ICD-10-CM

## 2021-03-16 PROCEDURE — 97110 THERAPEUTIC EXERCISES: CPT

## 2021-03-16 PROCEDURE — 97112 NEUROMUSCULAR REEDUCATION: CPT

## 2021-03-16 NOTE — PROGRESS NOTES
Daily Note     Today's date: 3/16/2021  Patient name: Gila Wayne  : 1950  MRN: [id number]  Referring provider: Anabell Miguel MD  Dx:   Encounter Diagnosis     ICD-10-CM    1  Closed displaced fracture of right acetabulum with routine healing, unspecified portion of acetabulum, subsequent encounter  S32 401D    2  Status post total replacement of right hip  Z96 641                   Subjective: Pt states that his R LE is feeling stronger  Objective: See treatment diary below      Assessment: Tolerated treatment well  Patient demonstrated fatigue post treatment, exhibited good technique with therapeutic exercises and would benefit from continued PT  Pt's strength and endurance are improving and is making steady progress towards his goals  Plan: Continue per plan of care        Precautions: s/p R THR, chronic respiratory contdition      Manuals 2/11 2/16 2/23 2/25 3/2 3/4 3/9 3/11 3/16    PROM HK KO KO KO KO NR D/C D/C D/C                                           Ther Ex 2/11 2/16 2/23 2/25 3/2 3/4 3/9 3/11 3/16    Glute sets 5"  x30 5"  x30 5"  x30 5"  x30 5"  x30 5"  x30 5"  x30 5"  x30 5"  x30    Quad sets 5"  x30 5"  x30 5"  x30 5"  x30 5"  x30 5"  x30 5"  x30 5"  x30 5"  x30    Add sq 5"  x30 5"x30 5"  3x10 5"  3x10 5"  4x10 5"  x40 5"  x40 5"  x40 5"  x40    AA heel slides x30 x30 3x10 3x10 3x10 3x10 30x 30x 30x    AA abd/add x30 x30 np np np 3x10 np 30x 30x    Abd   Orng  3x10 Red  3x10 Red  4x10 Red  3x10 Red  3x10 Blue  3x10 Blue  3x12    SAQ   3x10 3x10 3x12 3x15 3x15 3x15 1/2#  3x10    Bridges w/foam roller   3x10 np 3x10 3x10 NT NT D/C    LAQ    3x10 3x10 3x15 3x15 3x15 1/2#  3x10    Standing HR's    3x10 np 3x10 3x12 3x12 3x12    Seated marches     2x10 3x12 On chair with pillow  3x10 3x10  On  Chair  With  pillow 3x10  On chair with  pillow    Sidestepping        2'x6 2'x6                                                        Ther Activity Gait Training 2/11            Brief HK                         Modalities 2/11 2/16            PRN            MHP post  15' np np

## 2021-03-18 ENCOUNTER — OFFICE VISIT (OUTPATIENT)
Dept: PHYSICAL THERAPY | Facility: MEDICAL CENTER | Age: 71
End: 2021-03-18
Payer: MEDICARE

## 2021-03-18 DIAGNOSIS — Z96.641 STATUS POST TOTAL REPLACEMENT OF RIGHT HIP: ICD-10-CM

## 2021-03-18 DIAGNOSIS — S32.401D CLOSED DISPLACED FRACTURE OF RIGHT ACETABULUM WITH ROUTINE HEALING, UNSPECIFIED PORTION OF ACETABULUM, SUBSEQUENT ENCOUNTER: Primary | ICD-10-CM

## 2021-03-18 PROCEDURE — 97110 THERAPEUTIC EXERCISES: CPT | Performed by: PHYSICAL THERAPIST

## 2021-03-18 NOTE — PROGRESS NOTES
Daily Note     Today's date: 3/18/2021  Patient name: Zane Regalado  : 1950  MRN: [id number]  Referring provider: Shilpa Elizalde MD  Dx:   Encounter Diagnosis     ICD-10-CM    1  Closed displaced fracture of right acetabulum with routine healing, unspecified portion of acetabulum, subsequent encounter  S32 401D    2  Status post total replacement of right hip  Z96 641                   Subjective: Pt reports that his hip is doing well  He has to leave early today due a medical procedure  Objective: See treatment diary below      Assessment: Tolerated treatment well  Patient demonstrated fatigue post treatment, exhibited good technique with therapeutic exercises and would benefit from continued PT  Pts activity tolerance is significantly improved  He was able to perform exercises without much rest         Plan: Continue per plan of care        Precautions: s/p R THR, chronic respiratory contdition      Manuals 2/11 2/16 2/23 2/25 3/2 3/4 3/9 3/11 3/16 3/18   PROM HK KO KO KO KO NR D/C D/C D/C NP                                          Ther Ex 2/11 2/16 2/23 2/25 3/2 3/4 3/9 3/11 3/16 3/18   Glute sets 5"  x30 5"  x30 5"  x30 5"  x30 5"  x30 5"  x30 5"  x30 5"  x30 5"  x30 D/C   Quad sets 5"  x30 5"  x30 5"  x30 5"  x30 5"  x30 5"  x30 5"  x30 5"  x30 5"  x30 NP   Add sq 5"  x30 5"x30 5"  3x10 5"  3x10 5"  4x10 5"  x40 5"  x40 5"  x40 5"  x40 5"  x30   AA heel slides x30 x30 3x10 3x10 3x10 3x10 30x 30x 30x NP   AA abd/add x30 x30 np np np 3x10 np 30x 30x NP   Abd   Orng  3x10 Red  3x10 Red  4x10 Red  3x10 Red  3x10 Blue  3x10 Blue  3x12 Blue  3x15   SAQ   3x10 3x10 3x12 3x15 3x15 3x15 1/2#  3x10 1#  3x10   Bridges w/foam roller   3x10 np 3x10 3x10 NT NT NP 3x10   LAQ    3x10 3x10 3x15 3x15 3x15 1/2#  3x10 1#  3x10   Standing HR's    3x10 np 3x10 3x12 3x12 3x12 NP   Seated marches     2x10 3x12 On chair with pillow  3x10 3x10  On  Chair  With  pillow 3x10  On chair with  pillow Stand  x30 Sidestepping        2'x6 2'x6 NP                                                       Ther Activity                                       Gait Training 2/11            Brief HK                         Modalities 2/11 2/16            PRN            MHP post  15' np np

## 2021-03-23 ENCOUNTER — OFFICE VISIT (OUTPATIENT)
Dept: PHYSICAL THERAPY | Facility: MEDICAL CENTER | Age: 71
End: 2021-03-23
Payer: MEDICARE

## 2021-03-23 DIAGNOSIS — Z96.641 STATUS POST TOTAL REPLACEMENT OF RIGHT HIP: ICD-10-CM

## 2021-03-23 DIAGNOSIS — S32.401D CLOSED DISPLACED FRACTURE OF RIGHT ACETABULUM WITH ROUTINE HEALING, UNSPECIFIED PORTION OF ACETABULUM, SUBSEQUENT ENCOUNTER: Primary | ICD-10-CM

## 2021-03-23 PROCEDURE — 97112 NEUROMUSCULAR REEDUCATION: CPT

## 2021-03-23 PROCEDURE — 97110 THERAPEUTIC EXERCISES: CPT

## 2021-03-23 NOTE — PROGRESS NOTES
Daily Note     Today's date: 3/23/2021  Patient name: Balwinder Badillo  : 1950  MRN: [id number]  Referring provider: Benita Garvin MD  Dx:   Encounter Diagnosis     ICD-10-CM    1  Closed displaced fracture of right acetabulum with routine healing, unspecified portion of acetabulum, subsequent encounter  S32 401D    2  Status post total replacement of right hip  Z96 641                   Subjective: Pt states that his hip strength is improving and is able to perform more activities now  Objective: See treatment diary below      Assessment: Tolerated treatment well  Patient demonstrated fatigue post treatment, exhibited good technique with therapeutic exercises and would benefit from continued PT  Pt's strength and endurance are showing an improvement and is progressing well to current tx plan  Plan: Continue per plan of care        Precautions: s/p R THR, chronic respiratory contdition      Manuals                                                                 Ther Ex 3/23                         Quad sets 5"  x30            Add sq 5"  x30            AA heel slides x30            Slide abd/add x30            Abd Blue  3x15            SAQ 1#  3x10            Bridges w/foam roller NP            LAQ 1#  3x10            Standing HR's 3x15            Marches 3x10            Sidestepping 2'x6                                                                Ther Activity                                       Gait Training                                       Modalities

## 2021-03-25 ENCOUNTER — OFFICE VISIT (OUTPATIENT)
Dept: PHYSICAL THERAPY | Facility: MEDICAL CENTER | Age: 71
End: 2021-03-25
Payer: MEDICARE

## 2021-03-25 DIAGNOSIS — Z96.641 STATUS POST TOTAL REPLACEMENT OF RIGHT HIP: ICD-10-CM

## 2021-03-25 DIAGNOSIS — S32.401D CLOSED DISPLACED FRACTURE OF RIGHT ACETABULUM WITH ROUTINE HEALING, UNSPECIFIED PORTION OF ACETABULUM, SUBSEQUENT ENCOUNTER: Primary | ICD-10-CM

## 2021-03-25 PROCEDURE — 97110 THERAPEUTIC EXERCISES: CPT

## 2021-03-25 PROCEDURE — 97112 NEUROMUSCULAR REEDUCATION: CPT

## 2021-03-25 NOTE — PROGRESS NOTES
Daily Note     Today's date: 3/25/2021  Patient name: Vamsi Gil  : 1950  MRN: [id number]  Referring provider: Farhad Staples MD  Dx:   Encounter Diagnosis     ICD-10-CM    1  Closed displaced fracture of right acetabulum with routine healing, unspecified portion of acetabulum, subsequent encounter  S32 401D    2  Status post total replacement of right hip  Z96 641                   Subjective: Pt reports that he is walking better and getting in and out of the car is easier now  Objective: See treatment diary below      Assessment: Tolerated treatment well  Patient demonstrated fatigue post treatment, exhibited good technique with therapeutic exercises and would benefit from continued PT   Pt's stamina is improving with each visit  Pt also demonstrates improved LE strength, making progress towards his goals  Plan: Continue per plan of care        Precautions: s/p R THR, chronic respiratory contdition      Manuals                                                                 Ther Ex 3/23 3/25                        Quad sets 5"  x30 5"  x30           Add sq 5"  x30 5"  x30           AA heel slides x30 x30           Slide abd/add x30 x30           Abd Blue  3x15 Blue  3x15           SAQ 1#  3x10 1#  3x12           Bridges w/foam roller NP NP           LAQ 1#  3x10 1#  3x12           Standing HR's 3x15 3x15           Marches  3x10 1#  3x10           Sidestepping 2'x6 2'x10           Standing HS curls  3x10           Weight shifting AP/ML  30x                                     Ther Activity                                       Gait Training                                       Modalities

## 2021-03-30 ENCOUNTER — OFFICE VISIT (OUTPATIENT)
Dept: PHYSICAL THERAPY | Facility: MEDICAL CENTER | Age: 71
End: 2021-03-30
Payer: MEDICARE

## 2021-03-30 DIAGNOSIS — Z96.641 STATUS POST TOTAL REPLACEMENT OF RIGHT HIP: ICD-10-CM

## 2021-03-30 DIAGNOSIS — S32.401D CLOSED DISPLACED FRACTURE OF RIGHT ACETABULUM WITH ROUTINE HEALING, UNSPECIFIED PORTION OF ACETABULUM, SUBSEQUENT ENCOUNTER: Primary | ICD-10-CM

## 2021-03-30 PROCEDURE — 97112 NEUROMUSCULAR REEDUCATION: CPT

## 2021-03-30 PROCEDURE — 97110 THERAPEUTIC EXERCISES: CPT

## 2021-03-30 NOTE — PROGRESS NOTES
Daily Note     Today's date: 3/30/2021  Patient name: Jaylene Santiago  : 1950  MRN: [id number]  Referring provider: Jose Antonio Mckeon MD  Dx:   Encounter Diagnosis     ICD-10-CM    1  Closed displaced fracture of right acetabulum with routine healing, unspecified portion of acetabulum, subsequent encounter  S32 401D    2  Status post total replacement of right hip  Z96 641                   Subjective: Pt reports that he is feeling good and that his hip is feeling stronger  Objective: See treatment diary below      Assessment: Tolerated treatment well  Patient demonstrated fatigue post treatment, exhibited good technique with therapeutic exercises and would benefit from continued PT  Pt's R glut mm was fatigued at end of todays tx and progressions made  Plan: Continue per plan of care        Precautions: s/p R THR, chronic respiratory contdition      Manuals                                                                 Ther Ex 3/23 3/25 3/30                       Quad sets 5"  x30 5"  x30 5"  x30          Add sq 5"  x30 5"  x30 5"  x30          AA heel slides x30 x30 1#  3x10          Slide abd/add x30 x30 1#  3x10          Abd Blue  3x15 Blue  3x15 Blue  3x15          SAQ 1#  3x10 1#  3x12 1#  3x12          Bridges w/foam roller NP NP NP          LAQ 1#  3x10 1#  3x12 1#  3x12          Standing HR's 3x15 3x15 3x15          Marches  3x10 1#  3x10 1#  3x10          Sidestepping 2'x6 2'x10 2'x10          Standing HS curls  3x10 3x10          Weight shifting AP/ML  30x np                                    Ther Activity                                       Gait Training                                       Modalities

## 2021-04-01 ENCOUNTER — OFFICE VISIT (OUTPATIENT)
Dept: PHYSICAL THERAPY | Facility: MEDICAL CENTER | Age: 71
End: 2021-04-01
Payer: MEDICARE

## 2021-04-01 DIAGNOSIS — S32.401D CLOSED DISPLACED FRACTURE OF RIGHT ACETABULUM WITH ROUTINE HEALING, UNSPECIFIED PORTION OF ACETABULUM, SUBSEQUENT ENCOUNTER: Primary | ICD-10-CM

## 2021-04-01 DIAGNOSIS — Z96.641 STATUS POST TOTAL REPLACEMENT OF RIGHT HIP: ICD-10-CM

## 2021-04-01 PROCEDURE — 97110 THERAPEUTIC EXERCISES: CPT

## 2021-04-01 PROCEDURE — 97112 NEUROMUSCULAR REEDUCATION: CPT

## 2021-04-01 NOTE — PROGRESS NOTES
Daily Note     Today's date: 2021  Patient name: Diamond Delgado  : 1950  MRN: [id number]  Referring provider: Kesha Craig MD  Dx:   Encounter Diagnosis     ICD-10-CM    1  Closed displaced fracture of right acetabulum with routine healing, unspecified portion of acetabulum, subsequent encounter  S32 401D    2  Status post total replacement of right hip  Z96 641                   Subjective: Pt reports that his hip was sore after LV  Objective: See treatment diary below      Assessment: Tolerated treatment well  Patient demonstrated fatigue post treatment, exhibited good technique with therapeutic exercises and would benefit from continued PT  Pt continues to make progress and his endurance continues to improve  Plan: Continue per plan of care        Precautions: s/p R THR, chronic respiratory contdition      Manuals                                                                 Ther Ex 3/23 3/25 3/30 4/1                      Quad sets 5"  x30 5"  x30 5"  x30 5"  x30         Add sq 5"  x30 5"  x30 5"  x30 5"  x30         AA heel slides x30 x30 1#  3x10 1#  3x10         Slide abd/add x30 x30 1#  3x10 1#  3x10         Abd Blue  3x15 Blue  3x15 Blue  3x15 Blue  3x15         SAQ 1#  3x10 1#  3x12 1#  3x12 1#  3x12         Bridges w/foam roller NP NP NP NP         LAQ 1#  3x10 1#  3x12 1#  3x12 1#  3x12         Standing HR's 3x15 3x15 3x15 3x15         Marches  3x10 1#  3x10 1#  3x10 3x10         Sidestepping 2'x6 2'x10 2'x10 2'  x10         Standing HS curls  3x10 3x10 1#  3x10         Weight shifting AP/ML  30x np 30x                                   Ther Activity                                       Gait Training                                       Modalities

## 2021-04-06 ENCOUNTER — APPOINTMENT (OUTPATIENT)
Dept: PHYSICAL THERAPY | Facility: MEDICAL CENTER | Age: 71
End: 2021-04-06
Payer: MEDICARE

## 2021-04-08 ENCOUNTER — OFFICE VISIT (OUTPATIENT)
Dept: PHYSICAL THERAPY | Facility: MEDICAL CENTER | Age: 71
End: 2021-04-08
Payer: MEDICARE

## 2021-04-08 DIAGNOSIS — S32.401D CLOSED DISPLACED FRACTURE OF RIGHT ACETABULUM WITH ROUTINE HEALING, UNSPECIFIED PORTION OF ACETABULUM, SUBSEQUENT ENCOUNTER: Primary | ICD-10-CM

## 2021-04-08 DIAGNOSIS — Z96.641 STATUS POST TOTAL REPLACEMENT OF RIGHT HIP: ICD-10-CM

## 2021-04-08 PROCEDURE — 97112 NEUROMUSCULAR REEDUCATION: CPT

## 2021-04-08 PROCEDURE — 97110 THERAPEUTIC EXERCISES: CPT

## 2021-04-08 NOTE — PROGRESS NOTES
Daily Note     Today's date: 2021  Patient name: Ricky Campos  : 1950  MRN: [id number]  Referring provider: Gabriela Clayton MD  Dx:   Encounter Diagnosis     ICD-10-CM    1  Closed displaced fracture of right acetabulum with routine healing, unspecified portion of acetabulum, subsequent encounter  S32 401D    2  Status post total replacement of right hip  Z96 641                   Subjective: Pt reports that his hip is feeling good, but "if only I can breathe"  Objective: See treatment diary below      Assessment: Tolerated treatment well  Patient demonstrated fatigue post treatment, exhibited good technique with therapeutic exercises and would benefit from continued PT   Pt is responding well to current tx plan and strengthening ex's  Plan: Continue per plan of care        Precautions: s/p R THR, chronic respiratory contdition      Manuals                                                                 Ther Ex 3/23 3/25 3/30 4/1 4/8                     Quad sets 5"  x30 5"  x30 5"  x30 5"  x30 5"  x30        Add sq 5"  x30 5"  x30 5"  x30 5"  x30 5"  x30        AA heel slides x30 x30 1#  3x10 1#  3x10 3x10        Slide abd/add x30 x30 1#  3x10 1#  3x10 3x10        Abd Blue  3x15 Blue  3x15 Blue  3x15 Blue  3x15 Blue  3x15        SAQ 1#  3x10 1#  3x12 1#  3x12 1#  3x12 1#  3x15        Bridges w/foam roller NP NP NP NP         LAQ 1#  3x10 1#  3x12 1#  3x12 1#  3x12 1#  3x15        Standing HR's 3x15 3x15 3x15 3x15 3x15        Marches  3x10 1#  3x10 1#  3x10 3x10 3x10        Sidestepping 2'x6 2'x10 2'x10 2'  x10 2'  x10        Standing HS curls  3x10 3x10 1#  3x10 1#  3x12        Weight shifting AP/ML  30x np 30x 30x                                    Ther Activity                                       Gait Training                                       Modalities

## 2021-04-13 ENCOUNTER — APPOINTMENT (OUTPATIENT)
Dept: PHYSICAL THERAPY | Facility: MEDICAL CENTER | Age: 71
End: 2021-04-13
Payer: MEDICARE

## 2021-04-15 ENCOUNTER — APPOINTMENT (OUTPATIENT)
Dept: PHYSICAL THERAPY | Facility: MEDICAL CENTER | Age: 71
End: 2021-04-15
Payer: MEDICARE

## 2021-04-20 ENCOUNTER — OFFICE VISIT (OUTPATIENT)
Dept: PHYSICAL THERAPY | Facility: MEDICAL CENTER | Age: 71
End: 2021-04-20
Payer: MEDICARE

## 2021-04-20 DIAGNOSIS — S32.401D CLOSED DISPLACED FRACTURE OF RIGHT ACETABULUM WITH ROUTINE HEALING, UNSPECIFIED PORTION OF ACETABULUM, SUBSEQUENT ENCOUNTER: Primary | ICD-10-CM

## 2021-04-20 DIAGNOSIS — Z96.641 STATUS POST TOTAL REPLACEMENT OF RIGHT HIP: ICD-10-CM

## 2021-04-20 PROCEDURE — 97112 NEUROMUSCULAR REEDUCATION: CPT

## 2021-04-20 PROCEDURE — 97110 THERAPEUTIC EXERCISES: CPT

## 2021-04-20 NOTE — PROGRESS NOTES
Daily Note     Today's date: 2021  Patient name: Bobby Pereira  : 1950  MRN: [id number]  Referring provider: Ramakrishna Fowler MD  Dx:   Encounter Diagnosis     ICD-10-CM    1  Closed displaced fracture of right acetabulum with routine healing, unspecified portion of acetabulum, subsequent encounter  S32 401D    2  Status post total replacement of right hip  Z96 641                   Subjective: Pt reports that he is having more LBP lately  Pt was not able to attend PT sessions the past week or two due to a medical procedure  Objective: See treatment diary below      Assessment: Tolerated treatment well  Patient demonstrated fatigue post treatment, exhibited good technique with therapeutic exercises and would benefit from continued PT      Plan: Continue per plan of care        Precautions: s/p R THR, chronic respiratory contdition      Manuals                                                                 Ther Ex 3/23 3/25 3/30 4/1 4/8 4/20                    Quad sets 5"  x30 5"  x30 5"  x30 5"  x30 5"  x30 5"  x30       Add sq 5"  x30 5"  x30 5"  x30 5"  x30 5"  x30 5"  3x12       AA heel slides x30 x30 1#  3x10 1#  3x10 3x10 3x10       Slide abd/add x30 x30 1#  3x10 1#  3x10 3x10 3x10       Abd Blue  3x15 Blue  3x15 Blue  3x15 Blue  3x15 Blue  3x15 Blue  3x12       SAQ 1#  3x10 1#  3x12 1#  3x12 1#  3x12 1#  3x15 1#  3x15       Bridges w/foam roller NP NP NP NP         LAQ 1#  3x10 1#  3x12 1#  3x12 1#  3x12 1#  3x15 1#  3x15       Standing HR's 3x15 3x15 3x15 3x15 3x15 np       Marches  3x10 1#  3x10 1#  3x10 3x10 3x10 1#  3x10       Sidestepping 2'x6 2'x10 2'x10 2'  x10 2'  x10 2'  x10       Standing HS curls  3x10 3x10 1#  3x10 1#  3x12 1#  3x10       Weight shifting AP/ML  30x np 30x 30x   np                                 Ther Activity                                       Gait Training                                       Modalities

## 2021-04-22 ENCOUNTER — OFFICE VISIT (OUTPATIENT)
Dept: PHYSICAL THERAPY | Facility: MEDICAL CENTER | Age: 71
End: 2021-04-22
Payer: MEDICARE

## 2021-04-22 DIAGNOSIS — Z96.641 STATUS POST TOTAL REPLACEMENT OF RIGHT HIP: ICD-10-CM

## 2021-04-22 DIAGNOSIS — S32.401D CLOSED DISPLACED FRACTURE OF RIGHT ACETABULUM WITH ROUTINE HEALING, UNSPECIFIED PORTION OF ACETABULUM, SUBSEQUENT ENCOUNTER: Primary | ICD-10-CM

## 2021-04-22 PROCEDURE — 97110 THERAPEUTIC EXERCISES: CPT

## 2021-04-22 PROCEDURE — 97112 NEUROMUSCULAR REEDUCATION: CPT

## 2021-04-22 NOTE — PROGRESS NOTES
Daily Note     Today's date: 2021  Patient name: Mary Corrales  : 1950  MRN: [id number]  Referring provider: Sandra Bender MD  Dx:   Encounter Diagnosis     ICD-10-CM    1  Closed displaced fracture of right acetabulum with routine healing, unspecified portion of acetabulum, subsequent encounter  S32 401D    2  Status post total replacement of right hip  Z96 641                   Subjective: Pt reports that his hip is feeling pretty good  Objective: See treatment diary below      Assessment: Tolerated treatment well  Patient demonstrated fatigue post treatment, exhibited good technique with therapeutic exercises and would benefit from continued PT  Pt has responded well with current tx plan and demonstrates improved L LE strength  Plan: Continue per plan of care        Precautions: s/p R THR, chronic respiratory contdition      Manuals                                                                 Ther Ex 3/23 3/25 3/30 4/1 4/8 4/20 4/22                   Quad sets 5"  x30 5"  x30 5"  x30 5"  x30 5"  x30 5"  x30 5"  x30      Add sq 5"  x30 5"  x30 5"  x30 5"  x30 5"  x30 5"  3x12 5"  3x15      AA heel slides x30 x30 1#  3x10 1#  3x10 3x10 3x10   3x10      Slide abd/add x30 x30 1#  3x10 1#  3x10 3x10 3x10 3x10      Abd Blue  3x15 Blue  3x15 Blue  3x15 Blue  3x15 Blue  3x15 Blue  3x12 Blue  3x15      SAQ 1#  3x10 1#  3x12 1#  3x12 1#  3x12 1#  3x15 1#  3x15 1#  3x15      Bridges w/foam roller NP NP NP NP         LAQ 1#  3x10 1#  3x12 1#  3x12 1#  3x12 1#  3x15 1#  3x15 1#  3x15      Standing HR's 3x15 3x15 3x15 3x15 3x15 np 3x15      Marches  3x10 1#  3x10 1#  3x10 3x10 3x10 1#  3x10 1#  3x10      Sidestepping 2'x6 2'x10 2'x10 2'  x10 2'  x10 2'  x10 2'  x10      Standing HS curls  3x10 3x10 1#  3x10 1#  3x12 1#  3x10 1#  3x10      Weight shifting AP/ML  30x np 30x 30x   np np                                Ther Activity                                       Gait Training Modalities

## 2021-04-29 ENCOUNTER — TRANSCRIBE ORDERS (OUTPATIENT)
Dept: PHYSICAL THERAPY | Facility: MEDICAL CENTER | Age: 71
End: 2021-04-29

## 2021-04-29 ENCOUNTER — OFFICE VISIT (OUTPATIENT)
Dept: PHYSICAL THERAPY | Facility: MEDICAL CENTER | Age: 71
End: 2021-04-29
Payer: MEDICARE

## 2021-04-29 DIAGNOSIS — S32.401D CLOSED DISPLACED FRACTURE OF RIGHT ACETABULUM WITH ROUTINE HEALING, UNSPECIFIED PORTION OF ACETABULUM, SUBSEQUENT ENCOUNTER: Primary | ICD-10-CM

## 2021-04-29 DIAGNOSIS — Z96.641 STATUS POST TOTAL REPLACEMENT OF RIGHT HIP: ICD-10-CM

## 2021-04-29 PROCEDURE — 97112 NEUROMUSCULAR REEDUCATION: CPT | Performed by: PHYSICAL THERAPIST

## 2021-04-29 PROCEDURE — 97110 THERAPEUTIC EXERCISES: CPT | Performed by: PHYSICAL THERAPIST

## 2021-04-29 NOTE — LETTER
2021    Sadi Jaeger, [address]  [address]  [facility]    Patient: Yocasta Colón  YOB: 1950   Date of Visit: 2021     Encounter Diagnosis     ICD-10-CM    1  Closed displaced fracture of right acetabulum with routine healing, unspecified portion of acetabulum, subsequent encounter  S32 401D    2  Status post total replacement of right hip  Z96 641        Dear Dr Samantha Viera: Thank you for your recent referral of Yocasta Colón    Please review the attached evaluation summary from Marshall's recent visit  Please verify that you agree with the plan of care by signing the attached order  If you have any questions or concerns, please do not hesitate to call  I sincerely appreciate the opportunity to share in the care of one of your patients and hope to have another opportunity to work with you in the near future  Sincerely,    Silvia Barriga, PT      Referring Provider:      I certify that I have read the below Plan of Care and certify the need for these services furnished under this plan of treatment while under my care  Sadi JaegerDO  [address] [address] [address]  [address]  [address] [address]  Via Fax: [phone]          Daily Note     Today's date: 2021  Patient name: Yocasta Colón  : 1950  MRN: [id number]  Referring provider: Antonino Aiken MD  Dx:   Encounter Diagnosis     ICD-10-CM    1  Closed displaced fracture of right acetabulum with routine healing, unspecified portion of acetabulum, subsequent encounter  S32 401D    2  Status post total replacement of right hip  Z96 641             Subjective:  Pt reports that he has less stability and confidence in his R leg than he does with his L  Pt notes having 4/10 R LB/upper hip pain when he first stands up from a chair and sometimes when standing on his R LE  Objective: See treatment diary below      Assessment: Tolerated treatment fair  Patient demonstrated fatigue post treatment, exhibited good technique with therapeutic exercises and would benefit from continued PT  Pt was challenged by the sit to stand exercise today  Plan: Continue per plan of care  Precautions: s/p R THR, chronic respiratory contdition      Ther Ex 3/23 3/25 3/30 4/1 4/8 4/20 4/22 4/29                  Quad sets 5"  x30 5"  x30 5"  x30 5"  x30 5"  x30 5"  x30 5"  x30 NP     Add sq 5"  x30 5"  x30 5"  x30 5"  x30 5"  x30 5"  3x12 5"  3x15      AA heel slides x30 x30 1#  3x10 1#  3x10 3x10 3x10   3x10 NP     Slide abd/add x30 x30 1#  3x10 1#  3x10 3x10 3x10 3x10 NP     Abd Blue  3x15 Blue  3x15 Blue  3x15 Blue  3x15 Blue  3x15 Blue  3x12 Blue  3x15 NP     SAQ 1#  3x10 1#  3x12 1#  3x12 1#  3x12 1#  3x15 1#  3x15 1#  3x15 NP     Bridges w/foam roller NP NP NP NP         LAQ 1#  3x10 1#  3x12 1#  3x12 1#  3x12 1#  3x15 1#  3x15 1#  3x15 1#  x30     Standing HR's 3x15 3x15 3x15 3x15 3x15 np 3x15 3x15     Marches  3x10 1#  3x10 1#  3x10 3x10 3x10 1#  3x10 1#  3x10 1#  3x10     Sidestepping 2'x6 2'x10 2'x10 2'  x10 2'  x10 2'  x10 2'  x10      Standing HS curls  3x10 3x10 1#  3x10 1#  3x12 1#  3x10 1#  3x10 1#  3x10     Weight shifting AP/ML  30x np 30x 30x   np np NP     Sit to stand-chair         x20 total     Standing hip abd        3x10                                                                                                                                 PT Re-Evaluation     Today's date: 2021  Patient name: Mary Corrales  : 1950  MRN: [id number]  Referring provider: María Adorno DO  Dx:   Encounter Diagnosis     ICD-10-CM    1  Closed displaced fracture of right acetabulum with routine healing, unspecified portion of acetabulum, subsequent encounter  S32 401D    2  Status post total replacement of right hip  Z96 641            Assessment  Assessment details: Pt is a pleasant 78 yo male presenting to physical therapy s/p R VENKATESH    Pt would benefit from continued skilled PT to address current impairments and maximize pts function  Understanding of Dx/Px/POC: good   Prognosis: good    Pt has made good progress so far, he would benefit from continued PT due to the deconditioning prior to surgery  Goals  Impairment Goals  - Pt I with initial HEP in 1-2 visits  Met  - Improve ROM equal to contralateral side in 6 weeks     Met   - Increase strength to at least 3+/5 in all affected areas in 4-6 week    Not met    Functional Goals  - Increase FOTO to at least 44 in 6-8 weeks   Not met  - Patient will be independent with comprehensive HEP in 8+ weeks   Not met  - Maximize ambulation and activity tolerance in 8+ weeks   Not met      Plan  Patient would benefit from: skilled physical therapy  Other planned modality interventions: Modalities prn   Planned therapy interventions: manual therapy, neuromuscular re-education, patient education, postural training, strengthening, stretching, therapeutic activities, therapeutic exercise and home exercise program  Frequency: 2x week  Duration in weeks: 8  Treatment plan discussed with: patient and family        Subjective Evaluation    History of Present Illness  Date of surgery: 2021  Mechanism of injury: Pt with a long history of hip pain and dysfunction from a hip injury that occur when he had a seizure  Pt underwent the surgery on a Monday, was transitioned to transitional care after the surgery and went home on Friday  He states that his hip is feeling better and having less hip pain since surgery  Pain  Current pain ratin  At best pain ratin  At worst pain ratin      Update:  Pt is improving  He is not having any significant hip pain to report  He has occasional 4/10 R LB/post upper hip pain with getting out of chair and with walking  Pt also feels less confident in his R LE versus his L        Social Support    Employment status: not working  Treatments  Previous treatment: physical therapy  Patient Goals  Patient goals for therapy: decreased pain, increased motion, increased strength, independence with ADLs/IADLs and improved balance  Patient goal: Improved activity tolerance        Objective     Observations     Additional Observation Details  Gait:  Patient ambulates with a slow, steady gait pattern using a RW, FWB R LE    Vonda Gukandi has increased significantly since his IE     - swelling R LE         Neurological Testing     Sensation     Hip   Left Hip   Intact: light touch    Right Hip   Intact: light touch    Active Range of Motion   Left Hip   Normal active range of motion    Right Hip   Normal active range of motion    Passive Range of Motion   Left Hip   Flexion: 100 degrees   Extension: -10 degrees   Abduction: WFL  Adduction: WFL    Right Hip   Flexion: 100 degrees   Extension: -10 degrees   Abduction: WFL  Adduction: WFL    Additional Passive Range of Motion Details  IR and ER are WFL B in supine    Strength/Myotome Testing     Left Hip   Planes of Motion   Flexion: 3+  Extension: 3+  Abduction: 3+  Adduction: 3+    Right Hip   Planes of Motion   Flexion: 3  Extension: 3  Abduction: 3  Adduction: 3

## 2021-04-29 NOTE — PROGRESS NOTES
Daily Note     Today's date: 2021  Patient name: Natalia Miller  : 1950  MRN: [id number]  Referring provider: Farooq Núñez MD  Dx:   Encounter Diagnosis     ICD-10-CM    1  Closed displaced fracture of right acetabulum with routine healing, unspecified portion of acetabulum, subsequent encounter  S32 401D    2  Status post total replacement of right hip  Z96 641             Subjective:  Pt reports that he has less stability and confidence in his R leg than he does with his L  Pt notes having 4/10 R LB/upper hip pain when he first stands up from a chair and sometimes when standing on his R LE  Objective: See treatment diary below      Assessment: Tolerated treatment fair  Patient demonstrated fatigue post treatment, exhibited good technique with therapeutic exercises and would benefit from continued PT  Pt was challenged by the sit to stand exercise today  Plan: Continue per plan of care        Precautions: s/p R THR, chronic respiratory contdition      Ther Ex 3/23 3/25 3/30 4/1 4/8 4/20 4/22 4/29                  Quad sets 5"  x30 5"  x30 5"  x30 5"  x30 5"  x30 5"  x30 5"  x30 NP     Add sq 5"  x30 5"  x30 5"  x30 5"  x30 5"  x30 5"  3x12 5"  3x15      AA heel slides x30 x30 1#  3x10 1#  3x10 3x10 3x10   3x10 NP     Slide abd/add x30 x30 1#  3x10 1#  3x10 3x10 3x10 3x10 NP     Abd Blue  3x15 Blue  3x15 Blue  3x15 Blue  3x15 Blue  3x15 Blue  3x12 Blue  3x15 NP     SAQ 1#  3x10 1#  3x12 1#  3x12 1#  3x12 1#  3x15 1#  3x15 1#  3x15 NP     Bridges w/foam roller NP NP NP NP         LAQ 1#  3x10 1#  3x12 1#  3x12 1#  3x12 1#  3x15 1#  3x15 1#  3x15 1#  x30     Standing HR's 3x15 3x15 3x15 3x15 3x15 np 3x15 3x15     Marches  3x10 1#  3x10 1#  3x10 3x10 3x10 1#  3x10 1#  3x10 1#  3x10     Sidestepping 2'x6 2'x10 2'x10 2'  x10 2'  x10 2'  x10 2'  x10      Standing HS curls  3x10 3x10 1#  3x10 1#  3x12 1#  3x10 1#  3x10 1#  3x10     Weight shifting AP/ML  30x np 30x 30x   np np NP     Sit to stand-chair         x20 total     Standing hip abd        3x10

## 2021-05-11 ENCOUNTER — OFFICE VISIT (OUTPATIENT)
Dept: PHYSICAL THERAPY | Facility: MEDICAL CENTER | Age: 71
End: 2021-05-11
Payer: MEDICARE

## 2021-05-11 DIAGNOSIS — S32.401D CLOSED DISPLACED FRACTURE OF RIGHT ACETABULUM WITH ROUTINE HEALING, UNSPECIFIED PORTION OF ACETABULUM, SUBSEQUENT ENCOUNTER: Primary | ICD-10-CM

## 2021-05-11 DIAGNOSIS — Z96.641 STATUS POST TOTAL REPLACEMENT OF RIGHT HIP: ICD-10-CM

## 2021-05-11 PROCEDURE — 97110 THERAPEUTIC EXERCISES: CPT

## 2021-05-11 PROCEDURE — 97112 NEUROMUSCULAR REEDUCATION: CPT

## 2021-05-11 NOTE — PROGRESS NOTES
Daily Note     Today's date: 2021  Patient name: Jodee Yates  : 1950  MRN: [id number]  Referring provider: Ervin Brittle, MD  Dx:   Encounter Diagnosis     ICD-10-CM    1  Closed displaced fracture of right acetabulum with routine healing, unspecified portion of acetabulum, subsequent encounter  S32 401D    2  Status post total replacement of right hip  Z96 641                   Subjective: Pt reports that he was cleared to walk without AD  Objective: See treatment diary below      Assessment: Tolerated treatment well  Patient demonstrated fatigue post treatment, exhibited good technique with therapeutic exercises and would benefit from continued PT  Pt fatigues easily with sit to stands and with gait training w/o AD  Pt to practice walking w/o AD at home  Plan: Continue per plan of care        Precautions: s/p R THR, chronic respiratory contdition      Ther Ex 3/23 3/25 3/30 4/1 4/8 4/20 4/22 4/29 5/11                 Quad sets 5"  x30 5"  x30 5"  x30 5"  x30 5"  x30 5"  x30 5"  x30 NP     Add sq 5"  x30 5"  x30 5"  x30 5"  x30 5"  x30 5"  3x12 5"  3x15      AA heel slides x30 x30 1#  3x10 1#  3x10 3x10 3x10   3x10 NP     Slide abd/add x30 x30 1#  3x10 1#  3x10 3x10 3x10 3x10 NP     Abd Blue  3x15 Blue  3x15 Blue  3x15 Blue  3x15 Blue  3x15 Blue  3x12 Blue  3x15 NP     SAQ 1#  3x10 1#  3x12 1#  3x12 1#  3x12 1#  3x15 1#  3x15 1#  3x15 NP     Bridges w/foam roller NP NP NP NP         LAQ 1#  3x10 1#  3x12 1#  3x12 1#  3x12 1#  3x15 1#  3x15 1#  3x15 1#  x30 1#  x30    Standing HR's 3x15 3x15 3x15 3x15 3x15 np 3x15 3x15 3x15    Marches  3x10 1#  3x10 1#  3x10 3x10 3x10 1#  3x10 1#  3x10 1#  3x10 1#  x30    Sidestepping 2'x6 2'x10 2'x10 2'  x10 2'  x10 2'  x10 2'  x10  np    Standing HS curls  3x10 3x10 1#  3x10 1#  3x12 1#  3x10 1#  3x10 1#  3x10 1#  3x10    Weight shifting AP/ML  30x np 30x 30x   np np NP np    Sit to stand-chair         x20 total x20    Standing hip abd        3x10 3x10    Gait training         20'x2

## 2021-05-13 ENCOUNTER — APPOINTMENT (OUTPATIENT)
Dept: PHYSICAL THERAPY | Facility: MEDICAL CENTER | Age: 71
End: 2021-05-13
Payer: MEDICARE

## 2021-05-18 ENCOUNTER — OFFICE VISIT (OUTPATIENT)
Dept: PHYSICAL THERAPY | Facility: MEDICAL CENTER | Age: 71
End: 2021-05-18
Payer: MEDICARE

## 2021-05-18 DIAGNOSIS — S32.401D CLOSED DISPLACED FRACTURE OF RIGHT ACETABULUM WITH ROUTINE HEALING, UNSPECIFIED PORTION OF ACETABULUM, SUBSEQUENT ENCOUNTER: Primary | ICD-10-CM

## 2021-05-18 DIAGNOSIS — Z96.641 STATUS POST TOTAL REPLACEMENT OF RIGHT HIP: ICD-10-CM

## 2021-05-18 PROCEDURE — 97112 NEUROMUSCULAR REEDUCATION: CPT

## 2021-05-18 PROCEDURE — 97110 THERAPEUTIC EXERCISES: CPT

## 2021-05-18 NOTE — PROGRESS NOTES
Daily Note     Today's date: 2021  Patient name: King Raygoza  : 1950  MRN: [id number]  Referring provider: Gladys Field MD  Dx:   Encounter Diagnosis     ICD-10-CM    1  Closed displaced fracture of right acetabulum with routine healing, unspecified portion of acetabulum, subsequent encounter  S32 401D    2  Status post total replacement of right hip  Z96 641                   Subjective: Pt reports that he walked to the mailbox this morning without any AD and was quite fatigued afterwards  Objective: See treatment diary below      Assessment: Tolerated treatment well  Patient demonstrated fatigue post treatment, exhibited good technique with therapeutic exercises and would benefit from continued PT  Pt is making some progress in his strength and endurance  Plan: Continue per plan of care        Precautions: s/p R THR, chronic respiratory contdition      Ther Ex 3/23 3/25 3/30 4/1 4/8 4/20 4/22 4/29 5/11 5/18                Quad sets 5"  x30 5"  x30 5"  x30 5"  x30 5"  x30 5"  x30 5"  x30 NP     Add sq 5"  x30 5"  x30 5"  x30 5"  x30 5"  x30 5"  3x12 5"  3x15      AA heel slides x30 x30 1#  3x10 1#  3x10 3x10 3x10   3x10 NP  3x10   Slide abd/add x30 x30 1#  3x10 1#  3x10 3x10 3x10 3x10 NP     Abd Blue  3x15 Blue  3x15 Blue  3x15 Blue  3x15 Blue  3x15 Blue  3x12 Blue  3x15 NP     SAQ 1#  3x10 1#  3x12 1#  3x12 1#  3x12 1#  3x15 1#  3x15 1#  3x15 NP     Bridges w/foam roller NP NP NP NP         LAQ 1#  3x10 1#  3x12 1#  3x12 1#  3x12 1#  3x15 1#  3x15 1#  3x15 1#  x30 1#  x30 1#  x30   Standing HR's 3x15 3x15 3x15 3x15 3x15 np 3x15 3x15 3x15 3x15   Marches  3x10 1#  3x10 1#  3x10 3x10 3x10 1#  3x10 1#  3x10 1#  3x10 1#  x30 1#  3x10   Sidestepping 2'x6 2'x10 2'x10 2'  x10 2'  x10 2'  x10 2'  x10  np np   Standing HS curls  3x10 3x10 1#  3x10 1#  3x12 1#  3x10 1#  3x10 1#  3x10 1#  3x10 1#  3x10   Weight shifting AP/ML  30x np 30x 30x   np np NP np np   Sit to stand-chair         x20 total x20 np   Standing hip abd        3x10 3x10 3x10   Gait training         20'x2 20'     Mini squats          3x10

## 2021-05-20 ENCOUNTER — OFFICE VISIT (OUTPATIENT)
Dept: PHYSICAL THERAPY | Facility: MEDICAL CENTER | Age: 71
End: 2021-05-20
Payer: MEDICARE

## 2021-05-20 DIAGNOSIS — Z96.641 STATUS POST TOTAL REPLACEMENT OF RIGHT HIP: ICD-10-CM

## 2021-05-20 DIAGNOSIS — S32.401D CLOSED DISPLACED FRACTURE OF RIGHT ACETABULUM WITH ROUTINE HEALING, UNSPECIFIED PORTION OF ACETABULUM, SUBSEQUENT ENCOUNTER: Primary | ICD-10-CM

## 2021-05-20 PROCEDURE — 97112 NEUROMUSCULAR REEDUCATION: CPT | Performed by: PHYSICAL THERAPIST

## 2021-05-20 PROCEDURE — 97110 THERAPEUTIC EXERCISES: CPT | Performed by: PHYSICAL THERAPIST

## 2021-05-20 NOTE — PROGRESS NOTES
Daily Note     Today's date: 2021  Patient name: Ramirez Menon  : 1950  MRN: [id number]  Referring provider: Zakiya Sherman MD  Dx:   Encounter Diagnosis     ICD-10-CM    1  Closed displaced fracture of right acetabulum with routine healing, unspecified portion of acetabulum, subsequent encounter  S32 401D    2  Status post total replacement of right hip  Z96 641                   Subjective: Pt reports that he still feel uneven when walking  He has not been doing much walking without his rollator and he is not doing any exercise at home on a consistent basis  Objective: See treatment diary below      Assessment: Tolerated treatment fair  Patient demonstrated fatigue post treatment, exhibited good technique with therapeutic exercises and would benefit from continued PT  Pt fatigues easily during exercise  Plan: Continue per plan of care        Precautions: s/p R THR, chronic respiratory contdition      Ther Ex                          Quad sets NP            Add sq NP            AA heel slides NP            Slide abd/add NP            Abd NP            SAQ NP            Bridges w/foam roller NP            LAQ NP            Standing HR's NP            Marches  1#  3x10            Standing HS curls 3x10  1#            Weight shifting AP/ML NP            Sit to stand-chair  x15            Standing hip abd 3x10            Gait training NP            Mini squats 2x10

## 2021-05-27 ENCOUNTER — OFFICE VISIT (OUTPATIENT)
Dept: PHYSICAL THERAPY | Facility: MEDICAL CENTER | Age: 71
End: 2021-05-27
Payer: MEDICARE

## 2021-05-27 DIAGNOSIS — Z96.641 STATUS POST TOTAL REPLACEMENT OF RIGHT HIP: ICD-10-CM

## 2021-05-27 DIAGNOSIS — S32.401D CLOSED DISPLACED FRACTURE OF RIGHT ACETABULUM WITH ROUTINE HEALING, UNSPECIFIED PORTION OF ACETABULUM, SUBSEQUENT ENCOUNTER: Primary | ICD-10-CM

## 2021-05-27 PROCEDURE — 97112 NEUROMUSCULAR REEDUCATION: CPT | Performed by: PHYSICAL THERAPIST

## 2021-05-27 PROCEDURE — 97110 THERAPEUTIC EXERCISES: CPT | Performed by: PHYSICAL THERAPIST

## 2021-05-27 NOTE — PROGRESS NOTES
Daily Note     Today's date: 2021  Patient name: Ricky Campos  : 1950  MRN: [id number]    Dx:   Encounter Diagnosis     ICD-10-CM    1  Closed displaced fracture of right acetabulum with routine healing, unspecified portion of acetabulum, subsequent encounter  S32 401D    2  Status post total replacement of right hip  Z96 641                   Subjective: Pt reports that if he takes his inhaler at the right time, he doesn't get as winded with exercise  He has not been doing any exercise at home  Objective: See treatment diary below      Assessment: Tolerated treatment well  Patient demonstrated fatigue post treatment, exhibited good technique with therapeutic exercises and would benefit from continued PT  Pt would benefit from consistency of exercise at home and I suggested to him that he perform 20 sit to stand exercises per day  I feel like even with 20 sit to stands he will notice an improvement in his endurance and strength  Plan: Continue per plan of care        Precautions: s/p R THR, chronic respiratory contdition      Ther Ex                         Quad sets NP NP           Add sq NP NP           AA heel slides NP NP           Slide abd/add NP NP           Abd NP NP           SAQ NP NP           Bridges w/foam roller NP NP           LAQ NP 1#  3x10           Standing HR's NP NP           Marches  1#  3x10 1#  3x10           Standing HS curls 3x10  1# 1#  3x10           Weight shifting AP/ML NP NP           Sit to stand-chair  x15 x25 total           Standing hip abd 3x10 3x10           Gait training NP NP           Mini squats 2x10 2x10

## 2021-06-01 ENCOUNTER — OFFICE VISIT (OUTPATIENT)
Dept: PHYSICAL THERAPY | Facility: MEDICAL CENTER | Age: 71
End: 2021-06-01
Payer: MEDICARE

## 2021-06-01 DIAGNOSIS — Z96.641 STATUS POST TOTAL REPLACEMENT OF RIGHT HIP: ICD-10-CM

## 2021-06-01 DIAGNOSIS — S32.401D CLOSED DISPLACED FRACTURE OF RIGHT ACETABULUM WITH ROUTINE HEALING, UNSPECIFIED PORTION OF ACETABULUM, SUBSEQUENT ENCOUNTER: Primary | ICD-10-CM

## 2021-06-01 PROCEDURE — 97112 NEUROMUSCULAR REEDUCATION: CPT

## 2021-06-01 PROCEDURE — 97110 THERAPEUTIC EXERCISES: CPT

## 2021-06-01 NOTE — PROGRESS NOTES
Daily Note     Today's date: 2021  Patient name: Ana Velazco  : 1950  MRN: [id number]  Referring provider: Sharlene Degroot MD  Dx:   Encounter Diagnosis     ICD-10-CM    1  Closed displaced fracture of right acetabulum with routine healing, unspecified portion of acetabulum, subsequent encounter  S32 401D    2  Status post total replacement of right hip  Z96 641                   Subjective: Pt reports that he's been performing his sit to stand ex's on a daily basis and notes improved LE strength, but has continued trouble breathing  Objective: See treatment diary below      Assessment: Tolerated treatment well  Patient demonstrated fatigue post treatment, exhibited good technique with therapeutic exercises and would benefit from continued PT  Improved juliane with ambulation and quad strength demonstrates improved control as well  Plan: Continue per plan of care        Precautions: s/p R THR, chronic respiratory contdition      Ther Ex  6                       Quad sets NP NP           Add sq NP NP           AA heel slides NP NP           Slide abd/add NP NP           Abd NP NP           SAQ NP NP           Bridges w/foam roller NP NP           LAQ NP 1#  3x10 1#  3x12          Standing HR's NP NP 3x10          Marches  1#  3x10 1#  3x10 1#  3x10          Standing HS curls 3x10  1# 1#  3x10 1#  3x10          Weight shifting AP/ML NP NP NP          Sit to stand-chair  x15 x25 total 2x10          Standing hip abd 3x10 3x10 3x12          Gait training NP NP           Mini squats 2x10 2x10 2x10

## 2021-06-03 ENCOUNTER — OFFICE VISIT (OUTPATIENT)
Dept: PHYSICAL THERAPY | Facility: MEDICAL CENTER | Age: 71
End: 2021-06-03
Payer: MEDICARE

## 2021-06-03 DIAGNOSIS — Z96.641 STATUS POST TOTAL REPLACEMENT OF RIGHT HIP: ICD-10-CM

## 2021-06-03 DIAGNOSIS — S32.401D CLOSED DISPLACED FRACTURE OF RIGHT ACETABULUM WITH ROUTINE HEALING, UNSPECIFIED PORTION OF ACETABULUM, SUBSEQUENT ENCOUNTER: Primary | ICD-10-CM

## 2021-06-03 PROCEDURE — 97110 THERAPEUTIC EXERCISES: CPT

## 2021-06-03 PROCEDURE — 97112 NEUROMUSCULAR REEDUCATION: CPT

## 2021-06-03 NOTE — PROGRESS NOTES
Daily Note     Today's date: 6/3/2021  Patient name: Jodee Yates  : 1950  MRN: [id number]  Referring provider: Ervin Brittle, MD  Dx:   Encounter Diagnosis     ICD-10-CM    1  Closed displaced fracture of right acetabulum with routine healing, unspecified portion of acetabulum, subsequent encounter  S32 401D    2  Status post total replacement of right hip  Z96 641                   Subjective: Pt reports that his hip and legs feel stronger, but continues to struggle with being out of breath  Objective: See treatment diary below      Assessment: Tolerated treatment well  Patient demonstrated fatigue post treatment, exhibited good technique with therapeutic exercises and would benefit from continued PT  Pt's strength and endurance are showing improvement and is responding well to current tx plan  Plan: Continue per plan of care        Precautions: s/p R THR, chronic respiratory contdition      Ther Ex  6/3                      Quad sets NP NP           Add sq NP NP           AA heel slides NP NP           Slide abd/add NP NP           Abd NP NP           SAQ NP NP           Bridges w/foam roller NP NP           LAQ NP 1#  3x10 1#  3x12 1#  3x12         Standing HR's NP NP 3x10 3x12         Marches  1#  3x10 1#  3x10 1#  3x10 1#  3x10         Standing HS curls 3x10  1# 1#  3x10 1#  3x10 1#  3x10         Weight shifting AP/ML NP NP NP          Sit to stand-chair  x15 x25 total 2x10 2x10         Standing hip abd 3x10 3x10 3x12 3x12         Gait training NP NP           Mini squats 2x10 2x10 2x10 2x10

## 2021-06-08 ENCOUNTER — APPOINTMENT (OUTPATIENT)
Dept: PHYSICAL THERAPY | Facility: MEDICAL CENTER | Age: 71
End: 2021-06-08
Payer: MEDICARE

## 2021-06-10 ENCOUNTER — OFFICE VISIT (OUTPATIENT)
Dept: PHYSICAL THERAPY | Facility: MEDICAL CENTER | Age: 71
End: 2021-06-10
Payer: MEDICARE

## 2021-06-10 DIAGNOSIS — Z96.641 STATUS POST TOTAL REPLACEMENT OF RIGHT HIP: ICD-10-CM

## 2021-06-10 DIAGNOSIS — S32.401D CLOSED DISPLACED FRACTURE OF RIGHT ACETABULUM WITH ROUTINE HEALING, UNSPECIFIED PORTION OF ACETABULUM, SUBSEQUENT ENCOUNTER: Primary | ICD-10-CM

## 2021-06-10 PROCEDURE — 97110 THERAPEUTIC EXERCISES: CPT

## 2021-06-10 PROCEDURE — 97112 NEUROMUSCULAR REEDUCATION: CPT

## 2021-06-10 NOTE — PROGRESS NOTES
Daily Note     Today's date: 6/10/2021  Patient name: Peg Shaw  : 1950  MRN: [id number]  Referring provider: Veronica Munoz MD  Dx:   Encounter Diagnosis     ICD-10-CM    1  Closed displaced fracture of right acetabulum with routine healing, unspecified portion of acetabulum, subsequent encounter  S32 401D    2  Status post total replacement of right hip  Z96 641                   Subjective: Pt reports that he received a new breathing apparatus today and feels as though it has been helpful already  Pt feels improved LE strength, but struggles with SOB  Objective: See treatment diary below      Assessment: Tolerated treatment well  Patient demonstrated fatigue post treatment, exhibited good technique with therapeutic exercises and would benefit from continued PT  Pt will perform sit to stand with new breathing apparatus at home  Plan: Continue per plan of care        Precautions: s/p R THR, chronic respiratory contdition      Ther Ex  6 6/3 6/10                     Quad sets NP NP           Add sq NP NP           AA heel slides NP NP           Slide abd/add NP NP           Abd NP NP           SAQ NP NP           Bridges w/foam roller NP NP           LAQ NP 1#  3x10 1#  3x12 1#  3x12 1#  3x15        Standing HR's NP NP 3x10 3x12 3x15        Marches  1#  3x10 1#  3x10 1#  3x10 1#  3x10 1#  3x10        Standing HS curls 3x10  1# 1#  3x10 1#  3x10 1#  3x10 1#  3x15        Weight shifting AP/ML NP NP NP          Sit to stand-chair  x15 x25 total 2x10 2x10 np        Standing hip abd 3x10 3x10 3x12 3x12 1#  3x10        Gait training NP NP           Mini squats 2x10 2x10 2x10 2x10 3x10

## 2021-06-15 ENCOUNTER — OFFICE VISIT (OUTPATIENT)
Dept: PHYSICAL THERAPY | Facility: MEDICAL CENTER | Age: 71
End: 2021-06-15
Payer: MEDICARE

## 2021-06-15 DIAGNOSIS — Z96.641 STATUS POST TOTAL REPLACEMENT OF RIGHT HIP: ICD-10-CM

## 2021-06-15 DIAGNOSIS — S32.401D CLOSED DISPLACED FRACTURE OF RIGHT ACETABULUM WITH ROUTINE HEALING, UNSPECIFIED PORTION OF ACETABULUM, SUBSEQUENT ENCOUNTER: Primary | ICD-10-CM

## 2021-06-15 PROCEDURE — 97112 NEUROMUSCULAR REEDUCATION: CPT

## 2021-06-15 PROCEDURE — 97110 THERAPEUTIC EXERCISES: CPT

## 2021-06-15 NOTE — PROGRESS NOTES
Daily Note     Today's date: 6/15/2021  Patient name: Jackelin Esposito  : 1950  MRN: [id number]  Referring provider: Robert Romero MD  Dx:   Encounter Diagnosis     ICD-10-CM    1  Closed displaced fracture of right acetabulum with routine healing, unspecified portion of acetabulum, subsequent encounter  S32 401D    2  Status post total replacement of right hip  Z96 641                   Subjective: Pt offers no changes since LV  Pt continues to struggle with SOB, but is feeling stronger overall  Objective: See treatment diary below      Assessment: Tolerated treatment well  Patient demonstrated fatigue post treatment, exhibited good technique with therapeutic exercises and would benefit from continued PT  Pt states that he will perform his sit to stand ex's at home  Plan: Continue per plan of care        Precautions: s/p R THR, chronic respiratory contdition      Ther Ex  6 6/3 6/10 6/15                    Quad sets NP NP           Add sq NP NP           AA heel slides NP NP           Slide abd/add NP NP           Abd NP NP           SAQ NP NP           Bridges w/foam roller NP NP           LAQ NP 1#  3x10 1#  3x12 1#  3x12 1#  3x15 1#  3x15       Standing HR's NP NP 3x10 3x12 3x15 3x15       Marches  1#  3x10 1#  3x10 1#  3x10 1#  3x10 1#  3x10 1#  3x15       Standing HS curls 3x10  1# 1#  3x10 1#  3x10 1#  3x10 1#  3x15 1#  3x15       Weight shifting AP/ML NP NP NP          Sit to stand-chair  x15 x25 total 2x10 2x10 np np       Standing hip abd 3x10 3x10 3x12 3x12 1#  3x10 1#  3x15       Gait training NP NP           Mini squats 2x10 2x10 2x10 2x10 3x10 3x10

## 2021-06-17 ENCOUNTER — OFFICE VISIT (OUTPATIENT)
Dept: PHYSICAL THERAPY | Facility: MEDICAL CENTER | Age: 71
End: 2021-06-17
Payer: MEDICARE

## 2021-06-17 DIAGNOSIS — S32.401D CLOSED DISPLACED FRACTURE OF RIGHT ACETABULUM WITH ROUTINE HEALING, UNSPECIFIED PORTION OF ACETABULUM, SUBSEQUENT ENCOUNTER: Primary | ICD-10-CM

## 2021-06-17 DIAGNOSIS — Z96.641 STATUS POST TOTAL REPLACEMENT OF RIGHT HIP: ICD-10-CM

## 2021-06-17 PROCEDURE — 97110 THERAPEUTIC EXERCISES: CPT

## 2021-06-17 PROCEDURE — 97112 NEUROMUSCULAR REEDUCATION: CPT

## 2021-06-17 NOTE — PROGRESS NOTES
Daily Note     Today's date: 2021  Patient name: Bret Murrieta  : 1950  MRN: [id number]  Referring provider: Nannette Rincon MD  Dx:   Encounter Diagnosis     ICD-10-CM    1  Closed displaced fracture of right acetabulum with routine healing, unspecified portion of acetabulum, subsequent encounter  S32 401D    2  Status post total replacement of right hip  Z96 641                   Subjective: Pt notes improved strength, but continues to be challenged with his shortness of breath  Objective: See treatment diary below      Assessment: Tolerated treatment well  Patient demonstrated fatigue post treatment, exhibited good technique with therapeutic exercises and would benefit from continued PT      Plan: Continue per plan of care        Precautions: s/p R THR, chronic respiratory contdition      Ther Ex  6/1 6/3 6/10 6/15 6/17                   Quad sets NP NP           Add sq NP NP           AA heel slides NP NP           Slide abd/add NP NP           Abd NP NP           SAQ NP NP           Bridges w/foam roller NP NP           LAQ NP 1#  3x10 1#  3x12 1#  3x12 1#  3x15 1#  3x15 1#  3x15      Standing HR's NP NP 3x10 3x12 3x15 3x15 3x15      Marches  1#  3x10 1#  3x10 1#  3x10 1#  3x10 1#  3x10 1#  3x15 1#  3x15      Standing HS curls 3x10  1# 1#  3x10 1#  3x10 1#  3x10 1#  3x15 1#  3x15 1#  3x15      Weight shifting AP/ML NP NP NP          Sit to stand-chair  x15 x25 total 2x10 2x10 np np 5x      Standing hip abd 3x10 3x10 3x12 3x12 1#  3x10 1#  3x15 1#  3x15      Gait training NP NP           Mini squats 2x10 2x10 2x10 2x10 3x10 3x10 3x10

## 2021-06-22 ENCOUNTER — OFFICE VISIT (OUTPATIENT)
Dept: PHYSICAL THERAPY | Facility: MEDICAL CENTER | Age: 71
End: 2021-06-22
Payer: MEDICARE

## 2021-06-22 DIAGNOSIS — S32.401D CLOSED DISPLACED FRACTURE OF RIGHT ACETABULUM WITH ROUTINE HEALING, UNSPECIFIED PORTION OF ACETABULUM, SUBSEQUENT ENCOUNTER: Primary | ICD-10-CM

## 2021-06-22 DIAGNOSIS — Z96.641 STATUS POST TOTAL REPLACEMENT OF RIGHT HIP: ICD-10-CM

## 2021-06-22 PROCEDURE — 97112 NEUROMUSCULAR REEDUCATION: CPT

## 2021-06-22 PROCEDURE — 97110 THERAPEUTIC EXERCISES: CPT

## 2021-06-22 NOTE — PROGRESS NOTES
Daily Note     Today's date: 2021  Patient name: Argenis Smalls  : 1950  MRN: [id number]  Referring provider: Austyn Maxwell MD  Dx:   Encounter Diagnosis     ICD-10-CM    1  Closed displaced fracture of right acetabulum with routine healing, unspecified portion of acetabulum, subsequent encounter  S32 401D    2  Status post total replacement of right hip  Z96 641                   Subjective: Pt reports that he can get in and out of the car and go from sit to stand at home now with improved strength, but fatigues easily due to his breathing issues  Objective: See treatment diary below      Assessment: Tolerated treatment well  Patient demonstrated fatigue post treatment, exhibited good technique with therapeutic exercises and would benefit from continued PT  Pt requested to hold on sit to stand ex's today  Resume at [facility] 144  Plan: Continue per plan of care        Precautions: s/p R THR, chronic respiratory contdition      Ther Ex  6 6/3 6/10 6/15 6/17 6/22                  Quad sets NP NP           Add sq NP NP           AA heel slides NP NP           Slide abd/add NP NP           Abd NP NP           SAQ NP NP           Bridges w/foam roller NP NP           LAQ NP 1#  3x10 1#  3x12 1#  3x12 1#  3x15 1#  3x15 1#  3x15 1#  3x15     Standing HR's NP NP 3x10 3x12 3x15 3x15 3x15 3x15     Marches  1#  3x10 1#  3x10 1#  3x10 1#  3x10 1#  3x10 1#  3x15 1#  3x15 1#  3x15     Standing HS curls 3x10  1# 1#  3x10 1#  3x10 1#  3x10 1#  3x15 1#  3x15 1#  3x15 1#  3x15     Weight shifting AP/ML NP NP NP          Sit to stand-chair  x15 x25 total 2x10 2x10 np np 5x np     Standing hip abd 3x10 3x10 3x12 3x12 1#  3x10 1#  3x15 1#  3x15 1#  3x15     Gait training NP NP           Mini squats 2x10 2x10 2x10 2x10 3x10 3x10 3x10 3x10

## 2021-06-24 ENCOUNTER — OFFICE VISIT (OUTPATIENT)
Dept: PHYSICAL THERAPY | Facility: MEDICAL CENTER | Age: 71
End: 2021-06-24
Payer: MEDICARE

## 2021-06-24 DIAGNOSIS — S32.401D CLOSED DISPLACED FRACTURE OF RIGHT ACETABULUM WITH ROUTINE HEALING, UNSPECIFIED PORTION OF ACETABULUM, SUBSEQUENT ENCOUNTER: Primary | ICD-10-CM

## 2021-06-24 DIAGNOSIS — Z96.641 STATUS POST TOTAL REPLACEMENT OF RIGHT HIP: ICD-10-CM

## 2021-06-24 PROCEDURE — 97110 THERAPEUTIC EXERCISES: CPT | Performed by: PHYSICAL THERAPIST

## 2021-06-24 PROCEDURE — 97112 NEUROMUSCULAR REEDUCATION: CPT | Performed by: PHYSICAL THERAPIST

## 2021-06-24 NOTE — PROGRESS NOTES
Daily Note     Today's date: 2021  Patient name: Pooja Rizo  : 1950  MRN: [id number]  Referring provider: Jia Goldberg MD  Dx:   Encounter Diagnosis     ICD-10-CM    1  Closed displaced fracture of right acetabulum with routine healing, unspecified portion of acetabulum, subsequent encounter  S32 401D    2  Status post total replacement of right hip  Z96 641                   Subjective: Pt reports continued trouble with his breathing that his limiting his activity level  Objective: See treatment diary below    Discussed HEP today and what is reasonable for the patient to perform on a regular basis  Pt was issued a HEP today  Assessment: Tolerated treatment fair  Patient demonstrated fatigue post treatment, exhibited good technique with therapeutic exercises and would benefit from continued PT  Plan: Continue per plan of care        Precautions: s/p R THR, chronic respiratory contdition      Ther Ex  6 6/3 6/10 6/15 6/17 6/22 6/24                 Quad sets NP NP           Add sq NP NP           AA heel slides NP NP           Slide abd/add NP NP           Abd NP NP           SAQ NP NP           Bridges w/foam roller NP NP           LAQ NP 1#  3x10 1#  3x12 1#  3x12 1#  3x15 1#  3x15 1#  3x15 1#  3x15 3"  3x10    Standing HR's NP NP 3x10 3x12 3x15 3x15 3x15 3x15 3x15    Marches  1#  3x10 1#  3x10 1#  3x10 1#  3x10 1#  3x10 1#  3x15 1#  3x15 1#  3x15 3x10  3"    Standing HS curls 3x10  1# 1#  3x10 1#  3x10 1#  3x10 1#  3x15 1#  3x15 1#  3x15 1#  3x15 NP    Weight shifting AP/ML NP NP NP          Sit to stand-chair  x15 x25 total 2x10 2x10 np np 5x np Ref-  used    Standing hip abd 3x10 3x10 3x12 3x12 1#  3x10 1#  3x15 1#  3x15 1#  3x15 3"  3x10    Gait training NP NP           Mini squats 2x10 2x10 2x10 2x10 3x10 3x10 3x10 3x10 3x10    Toe raises         3x15

## 2021-06-29 ENCOUNTER — OFFICE VISIT (OUTPATIENT)
Dept: PHYSICAL THERAPY | Facility: MEDICAL CENTER | Age: 71
End: 2021-06-29
Payer: MEDICARE

## 2021-06-29 DIAGNOSIS — Z96.641 STATUS POST TOTAL REPLACEMENT OF RIGHT HIP: ICD-10-CM

## 2021-06-29 DIAGNOSIS — S32.401D CLOSED DISPLACED FRACTURE OF RIGHT ACETABULUM WITH ROUTINE HEALING, UNSPECIFIED PORTION OF ACETABULUM, SUBSEQUENT ENCOUNTER: Primary | ICD-10-CM

## 2021-06-29 PROCEDURE — 97110 THERAPEUTIC EXERCISES: CPT

## 2021-06-29 NOTE — PROGRESS NOTES
Daily Note     Today's date: 2021  Patient name: Jimbo Messina  : 1950  MRN: [id number]  Referring provider: Juan Pablo Breen MD  Dx:   Encounter Diagnosis     ICD-10-CM    1  Closed displaced fracture of right acetabulum with routine healing, unspecified portion of acetabulum, subsequent encounter  S32 401D    2  Status post total replacement of right hip  Z96 641                   Subjective: Pt reports that his quad mm's are sore due to ex's  Objective: See treatment diary below      Assessment: Tolerated treatment well  Patient demonstrated fatigue post treatment, exhibited good technique with therapeutic exercises  Pt to be dc'd at   [facility]  Plan: Potential discharge next visit       Precautions: s/p R THR, chronic respiratory contdition      Ther Ex 5/20 5/27 6/1 6/3 6/10 6/15 6/17 6/22 6/24 6/29                Quad sets NP NP           Add sq NP NP           AA heel slides NP NP           Slide abd/add NP NP           Abd NP NP           SAQ NP NP           Bridges w/foam roller NP NP           LAQ NP 1#  3x10 1#  3x12 1#  3x12 1#  3x15 1#  3x15 1#  3x15 1#  3x15 3"  3x10 1#  3x15   Standing HR's NP NP 3x10 3x12 3x15 3x15 3x15 3x15 3x15 3x15   Marches  1#  3x10 1#  3x10 1#  3x10 1#  3x10 1#  3x10 1#  3x15 1#  3x15 1#  3x15 3x10  3" 1#  3x15   Standing HS curls 3x10  1# 1#  3x10 1#  3x10 1#  3x10 1#  3x15 1#  3x15 1#  3x15 1#  3x15 NP 1#  3x15   Weight shifting AP/ML NP NP NP          Sit to stand-chair  x15 x25 total 2x10 2x10 np np 5x np Ref-  used Refused   Standing hip abd 3x10 3x10 3x12 3x12 1#  3x10 1#  3x15 1#  3x15 1#  3x15 3"  3x10 1#  3x15   Gait training NP NP           Mini squats 2x10 2x10 2x10 2x10 3x10 3x10 3x10 3x10 3x10 3x15   Toe raises         3x15 3x15

## 2021-07-01 ENCOUNTER — OFFICE VISIT (OUTPATIENT)
Dept: PHYSICAL THERAPY | Facility: MEDICAL CENTER | Age: 71
End: 2021-07-01
Payer: MEDICARE

## 2021-07-01 DIAGNOSIS — Z96.641 STATUS POST TOTAL REPLACEMENT OF RIGHT HIP: ICD-10-CM

## 2021-07-01 DIAGNOSIS — S32.401D CLOSED DISPLACED FRACTURE OF RIGHT ACETABULUM WITH ROUTINE HEALING, UNSPECIFIED PORTION OF ACETABULUM, SUBSEQUENT ENCOUNTER: Primary | ICD-10-CM

## 2021-07-01 PROCEDURE — 97112 NEUROMUSCULAR REEDUCATION: CPT

## 2021-07-01 PROCEDURE — 97110 THERAPEUTIC EXERCISES: CPT

## 2021-07-01 NOTE — PROGRESS NOTES
Daily Note     Today's date: 2021  Patient name: Lynne Lopez  : 1950  MRN: [id number]  Referring provider: Daryl Khan MD  Dx:   Encounter Diagnosis     ICD-10-CM    1  Closed displaced fracture of right acetabulum with routine healing, unspecified portion of acetabulum, subsequent encounter  S32 401D    2  Status post total replacement of right hip  Z96 641                   Subjective: Pt reports to PT w/o use of RW  Pt notes improved strength since beginning PT, but breathing remains difficult  Objective: See treatment diary below      Assessment: Tolerated treatment well  Patient demonstrated fatigue post treatment and exhibited good technique with therapeutic exercises  Pt displays a good understanding of his ex's  Updated hep was issued for continue strengthening at home  Plan: Pt dc'd to hep after todays visit        Precautions: s/p R THR, chronic respiratory contdition      Ther Ex                          Quad sets NP            Add sq NP            AA heel slides NP            Slide abd/add NP            Abd NP            SAQ NP            Bridges w/foam roller NP            LAQ 1#  3x15            Standing HR's 3x15            Marches  1#  3x15            Standing HS curls 3x15  1#                         Sit to stand-chair  np            Standing hip abd 1#  3x15                         Mini squats 3x15            Toe raises 3x15
